# Patient Record
Sex: MALE | Race: WHITE | Employment: UNEMPLOYED | URBAN - NONMETROPOLITAN AREA
[De-identification: names, ages, dates, MRNs, and addresses within clinical notes are randomized per-mention and may not be internally consistent; named-entity substitution may affect disease eponyms.]

---

## 2024-10-12 ENCOUNTER — HOSPITAL ENCOUNTER (EMERGENCY)
Age: 49
Discharge: HOME | End: 2024-10-12
Attending: STUDENT IN AN ORGANIZED HEALTH CARE EDUCATION/TRAINING PROGRAM
Payer: MEDICAID

## 2024-10-12 VITALS
HEIGHT: 72 IN | TEMPERATURE: 98.7 F | BODY MASS INDEX: 37.61 KG/M2 | RESPIRATION RATE: 16 BRPM | OXYGEN SATURATION: 93 % | SYSTOLIC BLOOD PRESSURE: 171 MMHG | HEART RATE: 66 BPM | DIASTOLIC BLOOD PRESSURE: 77 MMHG | WEIGHT: 277.7 LBS

## 2024-10-12 DIAGNOSIS — L02.91 ABSCESS: Primary | ICD-10-CM

## 2024-10-12 PROCEDURE — 10061 I&D ABSCESS COMP/MULTIPLE: CPT

## 2024-10-12 PROCEDURE — 99282 EMERGENCY DEPT VISIT SF MDM: CPT

## 2024-10-12 RX ORDER — FUROSEMIDE 40 MG
40 TABLET ORAL DAILY
COMMUNITY
Start: 2024-10-13

## 2024-10-12 RX ORDER — CARVEDILOL 25 MG/1
1 TABLET ORAL 2 TIMES DAILY
COMMUNITY
Start: 2024-10-12

## 2024-10-12 RX ORDER — DULAGLUTIDE 0.75 MG/.5ML
0.75 INJECTION, SOLUTION SUBCUTANEOUS WEEKLY
COMMUNITY

## 2024-10-12 RX ORDER — ASPIRIN 81 MG/1
81 TABLET ORAL DAILY
COMMUNITY
Start: 2024-10-13

## 2024-10-12 RX ORDER — TRAZODONE HYDROCHLORIDE 100 MG/1
100 TABLET ORAL NIGHTLY
COMMUNITY

## 2024-10-12 RX ORDER — PANTOPRAZOLE SODIUM 40 MG/1
40 TABLET, DELAYED RELEASE ORAL DAILY
COMMUNITY
Start: 2024-10-13

## 2024-10-12 RX ORDER — NYSTATIN 100000 [USP'U]/G
POWDER TOPICAL
COMMUNITY
Start: 2024-10-12

## 2024-10-12 RX ORDER — LOSARTAN POTASSIUM 100 MG/1
100 TABLET ORAL DAILY
COMMUNITY
Start: 2024-10-13

## 2024-10-12 RX ORDER — SPIRONOLACTONE 25 MG/1
1 TABLET ORAL 2 TIMES DAILY
COMMUNITY
Start: 2024-10-12

## 2024-10-12 RX ORDER — INSULIN LISPRO 100 [IU]/ML
INJECTION, SOLUTION INTRAVENOUS; SUBCUTANEOUS
COMMUNITY
Start: 2024-10-12

## 2024-10-12 RX ORDER — CLINDAMYCIN HCL 300 MG
300 CAPSULE ORAL 3 TIMES DAILY
COMMUNITY
Start: 2024-10-12 | End: 2024-10-18

## 2024-10-12 RX ORDER — MIRTAZAPINE 15 MG/1
15 TABLET, FILM COATED ORAL NIGHTLY
COMMUNITY
Start: 2024-10-12 | End: 2024-10-18

## 2024-10-12 RX ORDER — NIFEDIPINE 60 MG/1
1 TABLET, EXTENDED RELEASE ORAL 2 TIMES DAILY
COMMUNITY
Start: 2024-10-12

## 2024-10-12 RX ORDER — ISOSORBIDE MONONITRATE 60 MG/1
60 TABLET, EXTENDED RELEASE ORAL DAILY
COMMUNITY
Start: 2024-10-13

## 2024-10-12 RX ORDER — ATORVASTATIN CALCIUM 40 MG/1
40 TABLET, FILM COATED ORAL DAILY
COMMUNITY
Start: 2024-10-12

## 2024-10-12 ASSESSMENT — PAIN - FUNCTIONAL ASSESSMENT
PAIN_FUNCTIONAL_ASSESSMENT: NONE - DENIES PAIN
PAIN_FUNCTIONAL_ASSESSMENT: NONE - DENIES PAIN

## 2024-10-13 NOTE — DISCHARGE INSTRUCTIONS
Follow-up with nurse practitioner at [facility] within the next 48 hours.  Check the wounds daily and return to emergency department for any worsening redness, if they become more prominent or swell more or if you have any fevers or chills or any new changing or worsening symptoms also return for chest pain, shortness of breath, Lancer dizziness, abdominal pain or any new changing or worsening symptoms where was here for reevaluation.  Take clindamycin given to you by previous provider and take full course of antibiotics

## 2024-10-15 ENCOUNTER — HOSPITAL ENCOUNTER (EMERGENCY)
Age: 49
Discharge: HOME | End: 2024-10-15
Attending: EMERGENCY MEDICINE
Payer: MEDICAID

## 2024-10-15 VITALS
DIASTOLIC BLOOD PRESSURE: 67 MMHG | HEIGHT: 72 IN | OXYGEN SATURATION: 100 % | TEMPERATURE: 97.5 F | HEART RATE: 61 BPM | RESPIRATION RATE: 16 BRPM | SYSTOLIC BLOOD PRESSURE: 141 MMHG | BODY MASS INDEX: 36.37 KG/M2 | WEIGHT: 268.5 LBS

## 2024-10-15 DIAGNOSIS — L72.3 SEBACEOUS CYST: Primary | ICD-10-CM

## 2024-10-15 PROCEDURE — 99282 EMERGENCY DEPT VISIT SF MDM: CPT

## 2024-10-15 PROCEDURE — 10060 I&D ABSCESS SIMPLE/SINGLE: CPT

## 2024-10-15 ASSESSMENT — PAIN - FUNCTIONAL ASSESSMENT
PAIN_FUNCTIONAL_ASSESSMENT: ACTIVITIES ARE NOT PREVENTED
PAIN_FUNCTIONAL_ASSESSMENT: 0-10
PAIN_FUNCTIONAL_ASSESSMENT: ACTIVITIES ARE NOT PREVENTED

## 2024-10-15 ASSESSMENT — PAIN DESCRIPTION - DESCRIPTORS
DESCRIPTORS: DISCOMFORT
DESCRIPTORS: DISCOMFORT

## 2024-10-15 ASSESSMENT — PAIN SCALES - GENERAL
PAINLEVEL_OUTOF10: 5
PAINLEVEL_OUTOF10: 10

## 2024-10-15 ASSESSMENT — PAIN DESCRIPTION - ORIENTATION
ORIENTATION: RIGHT
ORIENTATION: RIGHT

## 2024-10-15 ASSESSMENT — PAIN DESCRIPTION - LOCATION
LOCATION: BUTTOCKS
LOCATION: HIP

## 2024-10-15 ASSESSMENT — PAIN DESCRIPTION - PAIN TYPE
TYPE: ACUTE PAIN
TYPE: ACUTE PAIN

## 2024-10-15 NOTE — ED PROVIDER NOTES
Prescriptions    No medications on file       DISCONTINUED MEDICATIONS:  Discontinued Medications    No medications on file              (Please note that portions of this note were completed with a voice recognition program.  Efforts were made to edit the dictations but occasionally words are mis-transcribed.)    Paz Walsh MD (electronically signed)            Paz Walsh MD  10/15/24 0636

## 2024-10-18 ENCOUNTER — APPOINTMENT (OUTPATIENT)
Dept: GENERAL RADIOLOGY | Age: 49
End: 2024-10-18
Payer: MEDICAID

## 2024-10-18 ENCOUNTER — HOSPITAL ENCOUNTER (EMERGENCY)
Age: 49
Discharge: HOME | End: 2024-10-18
Attending: EMERGENCY MEDICINE
Payer: MEDICAID

## 2024-10-18 VITALS
HEART RATE: 53 BPM | SYSTOLIC BLOOD PRESSURE: 167 MMHG | DIASTOLIC BLOOD PRESSURE: 71 MMHG | RESPIRATION RATE: 16 BRPM | OXYGEN SATURATION: 96 % | BODY MASS INDEX: 36.69 KG/M2 | TEMPERATURE: 98 F | WEIGHT: 270.9 LBS | HEIGHT: 72 IN

## 2024-10-18 DIAGNOSIS — R42 LIGHTHEADEDNESS: ICD-10-CM

## 2024-10-18 DIAGNOSIS — E86.0 DEHYDRATION: Primary | ICD-10-CM

## 2024-10-18 LAB
ALBUMIN SERPL-MCNC: 3.8 G/DL (ref 3.4–5)
ALBUMIN/GLOB SERPL: 1.1 {RATIO} (ref 1.1–2.2)
ALP SERPL-CCNC: 76 U/L (ref 40–129)
ALT SERPL-CCNC: 9 U/L (ref 10–40)
ANION GAP SERPL CALCULATED.3IONS-SCNC: 11 MMOL/L (ref 3–16)
AST SERPL-CCNC: 19 U/L (ref 15–37)
BASOPHILS # BLD: 0.1 K/UL (ref 0–0.2)
BASOPHILS NFR BLD: 1.1 %
BILIRUB SERPL-MCNC: 0.3 MG/DL (ref 0–1)
BUN SERPL-MCNC: 18 MG/DL (ref 7–20)
CALCIUM SERPL-MCNC: 9.7 MG/DL (ref 8.3–10.6)
CHLORIDE SERPL-SCNC: 98 MMOL/L (ref 99–110)
CO2 SERPL-SCNC: 26 MMOL/L (ref 21–32)
CREAT SERPL-MCNC: 0.8 MG/DL (ref 0.9–1.3)
DEPRECATED RDW RBC AUTO: 17.8 % (ref 12.4–15.4)
EKG ATRIAL RATE: 57 BPM
EKG DIAGNOSIS: NORMAL
EKG P AXIS: 38 DEGREES
EKG P-R INTERVAL: 194 MS
EKG Q-T INTERVAL: 468 MS
EKG QRS DURATION: 124 MS
EKG QTC CALCULATION (BAZETT): 455 MS
EKG R AXIS: 264 DEGREES
EKG T AXIS: 84 DEGREES
EKG VENTRICULAR RATE: 57 BPM
EOSINOPHIL # BLD: 0.2 K/UL (ref 0–0.6)
EOSINOPHIL NFR BLD: 1.4 %
GFR SERPLBLD CREATININE-BSD FMLA CKD-EPI: >90 ML/MIN/{1.73_M2}
GLUCOSE BLD-MCNC: 137 MG/DL (ref 70–99)
GLUCOSE SERPL-MCNC: 139 MG/DL (ref 70–99)
HCT VFR BLD AUTO: 43.5 % (ref 40.5–52.5)
HGB BLD-MCNC: 13.3 G/DL (ref 13.5–17.5)
LACTATE BLDV-SCNC: 1.1 MMOL/L (ref 0.4–1.9)
LYMPHOCYTES # BLD: 1.8 K/UL (ref 1–5.1)
LYMPHOCYTES NFR BLD: 17.5 %
MCH RBC QN AUTO: 23.7 PG (ref 26–34)
MCHC RBC AUTO-ENTMCNC: 30.6 G/DL (ref 31–36)
MCV RBC AUTO: 77.5 FL (ref 80–100)
MONOCYTES # BLD: 0.5 K/UL (ref 0–1.3)
MONOCYTES NFR BLD: 5.2 %
NEUTROPHILS # BLD: 7.9 K/UL (ref 1.7–7.7)
NEUTROPHILS NFR BLD: 74.8 %
PERFORMED ON: ABNORMAL
PLATELET # BLD AUTO: 285 K/UL (ref 135–450)
PMV BLD AUTO: 7.7 FL (ref 5–10.5)
POTASSIUM SERPL-SCNC: 4.9 MMOL/L (ref 3.5–5.1)
PROT SERPL-MCNC: 7.4 G/DL (ref 6.4–8.2)
RBC # BLD AUTO: 5.61 M/UL (ref 4.2–5.9)
SODIUM SERPL-SCNC: 135 MMOL/L (ref 136–145)
TROPONIN, HIGH SENSITIVITY: 17 NG/L (ref 0–22)
TROPONIN, HIGH SENSITIVITY: 18 NG/L (ref 0–22)
WBC # BLD AUTO: 10.5 K/UL (ref 4–11)

## 2024-10-18 PROCEDURE — 71045 X-RAY EXAM CHEST 1 VIEW: CPT

## 2024-10-18 PROCEDURE — 84484 ASSAY OF TROPONIN QUANT: CPT

## 2024-10-18 PROCEDURE — 80053 COMPREHEN METABOLIC PANEL: CPT

## 2024-10-18 PROCEDURE — 99285 EMERGENCY DEPT VISIT HI MDM: CPT

## 2024-10-18 PROCEDURE — 96360 HYDRATION IV INFUSION INIT: CPT

## 2024-10-18 PROCEDURE — 85025 COMPLETE CBC W/AUTO DIFF WBC: CPT

## 2024-10-18 PROCEDURE — 36415 COLL VENOUS BLD VENIPUNCTURE: CPT

## 2024-10-18 PROCEDURE — 93005 ELECTROCARDIOGRAM TRACING: CPT | Performed by: EMERGENCY MEDICINE

## 2024-10-18 PROCEDURE — 2580000003 HC RX 258: Performed by: EMERGENCY MEDICINE

## 2024-10-18 PROCEDURE — 93010 ELECTROCARDIOGRAM REPORT: CPT | Performed by: INTERNAL MEDICINE

## 2024-10-18 PROCEDURE — 83605 ASSAY OF LACTIC ACID: CPT

## 2024-10-18 RX ORDER — 0.9 % SODIUM CHLORIDE 0.9 %
500 INTRAVENOUS SOLUTION INTRAVENOUS ONCE
Status: COMPLETED | OUTPATIENT
Start: 2024-10-18 | End: 2024-10-18

## 2024-10-18 RX ADMIN — SODIUM CHLORIDE 500 ML: 9 INJECTION, SOLUTION INTRAVENOUS at 13:03

## 2024-10-18 ASSESSMENT — LIFESTYLE VARIABLES
HOW OFTEN DO YOU HAVE A DRINK CONTAINING ALCOHOL: NEVER
HOW MANY STANDARD DRINKS CONTAINING ALCOHOL DO YOU HAVE ON A TYPICAL DAY: PATIENT DOES NOT DRINK

## 2024-10-18 ASSESSMENT — PAIN - FUNCTIONAL ASSESSMENT: PAIN_FUNCTIONAL_ASSESSMENT: NONE - DENIES PAIN

## 2024-10-18 NOTE — ED PROVIDER NOTES
EMERGENCY DEPARTMENT ENCOUNTER     [facility]     Pt Name: Venkata Parker   MRN: [id number]   Birthdate 1975   Date of evaluation: 10/18/2024   Provider: HELADIO ANTHONY MD   PCP: Ana Cortez   Note Started: 2:20 PM EDT 10/18/24     CHIEF COMPLAINT     Chief Complaint   Patient presents with    Loss of Consciousness     Reports feeling like he was going to pass out at [facility].        HISTORY OF PRESENT ILLNESS:  History from : Patient   Limitations to history : None     Venkata Parker is a 49 y.o. male who presents feeling like he might pass out.  The patient is at a rehab facility for alcohol abuse, he states that they changed his medications a little bit and he separately developed an abscess on his arm, with all of that he says he is just been feeling like he might faint today.  He has had no palpitations, no chest pain, no shortness of breath.  He does have a history of heart failure and diabetes.  His Remeron has been discontinued and he had been put on Seroquel.  He has had no fever, no nausea no vomiting.    Nursing Notes were all reviewed and agreed with or any disagreements were addressed in the HPI.     ROS: Positives and Pertinent negatives as per HPI.    PAST MEDICAL HISTORY     Past medical history:  has a past medical history of CHF (congestive heart failure) (HCC), Diabetes mellitus (HCC), and Hypertension.    Past surgical history:  has no past surgical history on file.      PHYSICAL EXAM:  ED Triage Vitals [10/18/24 1253]   BP Systolic BP Percentile Diastolic BP Percentile Temp Temp Source Pulse Respirations SpO2   (!) 160/67 -- -- 98 °F (36.7 °C) Oral 68 18 97 %      Height Weight - Scale         1.829 m (6') 122.9 kg (270 lb 14.4 oz)              Physical Exam   Pleasant cooperative gentleman no acute distress  HEENT dry mucous membranes, neck shows no JVD  Heart regular rate and rhythm with no murmurs rubs or gallops  Lungs are clear to auscultation

## 2024-10-18 NOTE — ED PROVIDER NOTES
Emergency Department Encounter    Patient: Venkata Parker  MRN: [id number]  : 1975  Date of Evaluation: 10/18/2024  ED Provider:  Asa Lowe MD    Triage Chief Complaint:   Wound Check (Pt states she was admitted for 3 days, has redness at IV site in left AC. Pt states he was started on antibiotics but wants checked again. Pt is from [facility]. )    [place]:  Venkata Parker is a 49 y.o. male with history seen below presenting with complaints of abscess to the left forearm.  Patient states he was recently in the hospital had IV placed in his left forearm and still feels some erythema with concern for abscess in this region.  Denies fevers or chills.  Denies motor or sensory changes, numbness or weakness.  Denies chest pain or shortness of breath.  Patient was just recently admitted to [facility] for CHF exacerbation and was diuresed and states he is feeling much better from this perspective.  Patient is on Eliquis currently.  Denies recent falls or trauma.  Denies abdominal pain, neck or back pain.  Denies headache, blurred vision, focal deficits, motor or sensory changes.  States he does have an area over the right lateral gluteal region which she has some mild swelling and discomfort as well.  Denies any overlying erythema in this region.    ROS - see HPI, below listed is current ROS at time of my eval:  At least 14 systems reviewed, negative other than HPI    Past Medical History:   Diagnosis Date    CHF (congestive heart failure) (HCC)     Diabetes mellitus (HCC)     Hypertension      History reviewed. No pertinent surgical history.  History reviewed. No pertinent family history.  Social History     Socioeconomic History    Marital status: Single     Spouse name: Not on file    Number of children: Not on file    Years of education: Not on file    Highest education level: Not on file   Occupational History    Not on file   Tobacco Use    Smoking status: Every Day     Types:

## 2024-11-30 ENCOUNTER — HOSPITAL ENCOUNTER (EMERGENCY)
Age: 49
Discharge: HOME | End: 2024-11-30
Attending: INTERNAL MEDICINE
Payer: MEDICAID

## 2024-11-30 VITALS
DIASTOLIC BLOOD PRESSURE: 65 MMHG | WEIGHT: 300.6 LBS | HEIGHT: 72 IN | HEART RATE: 55 BPM | BODY MASS INDEX: 40.71 KG/M2 | OXYGEN SATURATION: 95 % | TEMPERATURE: 97.9 F | SYSTOLIC BLOOD PRESSURE: 154 MMHG | RESPIRATION RATE: 18 BRPM

## 2024-11-30 DIAGNOSIS — L30.9 DERMATITIS: Primary | ICD-10-CM

## 2024-11-30 PROCEDURE — 99283 EMERGENCY DEPT VISIT LOW MDM: CPT

## 2024-11-30 RX ORDER — BENZOCAINE/MENTHOL 6 MG-10 MG
LOZENGE MUCOUS MEMBRANE
Qty: 30 G | Refills: 1 | Status: SHIPPED | OUTPATIENT
Start: 2024-11-30 | End: 2024-11-30

## 2024-11-30 RX ORDER — BENZOCAINE/MENTHOL 6 MG-10 MG
LOZENGE MUCOUS MEMBRANE
Qty: 30 G | Refills: 1 | Status: SHIPPED | OUTPATIENT
Start: 2024-11-30 | End: 2024-12-07

## 2024-11-30 ASSESSMENT — PAIN - FUNCTIONAL ASSESSMENT
PAIN_FUNCTIONAL_ASSESSMENT: ACTIVITIES ARE NOT PREVENTED
PAIN_FUNCTIONAL_ASSESSMENT: 0-10
PAIN_FUNCTIONAL_ASSESSMENT: 0-10
PAIN_FUNCTIONAL_ASSESSMENT: ACTIVITIES ARE NOT PREVENTED

## 2024-11-30 ASSESSMENT — PAIN DESCRIPTION - PAIN TYPE
TYPE: ACUTE PAIN
TYPE: ACUTE PAIN

## 2024-11-30 ASSESSMENT — PAIN DESCRIPTION - FREQUENCY
FREQUENCY: CONTINUOUS
FREQUENCY: CONTINUOUS

## 2024-11-30 ASSESSMENT — PAIN DESCRIPTION - LOCATION
LOCATION: HAND;FOOT
LOCATION: FOOT;HAND

## 2024-11-30 ASSESSMENT — PAIN SCALES - GENERAL: PAINLEVEL_OUTOF10: 5

## 2024-11-30 ASSESSMENT — PAIN DESCRIPTION - ONSET
ONSET: ON-GOING
ONSET: ON-GOING

## 2024-11-30 ASSESSMENT — PAIN DESCRIPTION - DESCRIPTORS
DESCRIPTORS: ITCHING
DESCRIPTORS: BURNING;ITCHING

## 2024-11-30 ASSESSMENT — PAIN DESCRIPTION - ORIENTATION: ORIENTATION: RIGHT;LEFT

## 2024-11-30 NOTE — ED PROVIDER NOTES
EMERGENCY MEDICINE PROVIDER NOTE    Patient Identification  Pt Name: Venkata Parker  MRN: [id number]  Birthdate 1975  Date of evaluation: 11/30/2024  Provider: KEELY GARCIA DO  PCP: Ana Cortez    Chief Complaint  Rash (Right foot spreading to hands.  States for 4-5 days)      HPI  (History provided by patient)  This is a 49 y.o. male who was brought in by self for a rash on the back of both hands and rash on the top of his right foot between his first and second digit.  He the symptoms started 4 to 5 days ago.  The rash is quite itchy and painful.  Patient denies fever and chills.  He denies any drainage from the skin.  He denies any injury.  Patient is presently residing at the [facility].     I have reviewed the following nursing documentation:  Allergies: Pcn [penicillins]    Past medical history:   Past Medical History:   Diagnosis Date    CHF (congestive heart failure) (HCC)     Diabetes mellitus (HCC)     Hypertension      Past surgical history: History reviewed. No pertinent surgical history.    Home medications:   Discharge Medication List as of 11/30/2024 12:04 PM        CONTINUE these medications which have NOT CHANGED    Details   QUEtiapine Fumarate (SEROQUEL PO) Take by mouthHistorical Med      apixaban (ELIQUIS) 5 MG TABS tablet Take 1 tablet by mouth 2 times dailyHistorical Med      aspirin 81 MG EC tablet Take 1 tablet by mouth dailyHistorical Med      atorvastatin (LIPITOR) 40 MG tablet Take 1 tablet by mouth dailyHistorical Med      carvedilol (COREG) 25 MG tablet Take 1 tablet by mouth 2 times dailyHistorical Med      dulaglutide (TRULICITY) 0.75 MG/0.5ML SOPN SC injection Inject 0.5 mLs into the skin once a weekHistorical Med      furosemide (LASIX) 40 MG tablet Take 1 tablet by mouth dailyHistorical Med      insulin lispro (HUMALOG,ADMELOG) 100 UNIT/ML SOLN injection vial Administer per sliding scale 15 MIN BEFORE MEALS AND AT BEDTIME. SCALE 2 Blood Sugars: 101-150 - None 151-200 - 2  aspect of both hands.  Patient has an area that is about 2 cm in diameter on the dorsal aspect of the right foot between the first and second digit it is also erythematous and flaky.  The areas are not warm to the touch.  I have low suspicion for cellulitis.    Procedures          Radiology  No orders to display       Labs  No results found for this visit on 11/30/24.    SEP-1  Is this patient to be included in the SEP-1 Core Measure due to severe sepsis or septic shock?   no     Screenings                    Medications Given During ED Visit  Medications - No data to display    Records Reviewed  I reviewed the patient's previous medical records    Social Determinants of Health  None    Chronic Conditions affecting care   has a past medical history of CHF (congestive heart failure) (HCC), Diabetes mellitus (HCC), and Hypertension.    MDM and ED Course  This appears to be dermatitis secondary to dry skin.  Patient states the rash hurts and is itchy.  I wrote the patient for hydrocortisone cream and encouraged him to use old male cream over-the-counter.  Patient is presently at the [facility].  I have low suspicion for Graves-Lenin syndrome and TEN.  This does not appear to be an allergic reaction or cellulitis.  Patient is stable for discharge.  Patient understands the plan to follow-up in the outpatient setting if symptoms persist.      Critical Care Time: 0 Minutes  This excludes separately billable procedures.      Final Impression  1. Dermatitis        Blood pressure (!) 154/65, pulse 55, temperature 97.9 °F (36.6 °C), temperature source Oral, resp. rate 18, height 1.829 m (6'), weight (!) 136.4 kg (300 lb 9.6 oz), SpO2 95%.     Disposition:  DISPOSITION Decision To Discharge 11/30/2024 11:51:36 AM           Patient Referrals:  [facility]  [address] [place] [address] [place] [address]  [phone]    As needed      Discharge Medications:  Discharge Medication List as of 11/30/2024

## 2024-12-08 ENCOUNTER — APPOINTMENT (OUTPATIENT)
Dept: CT IMAGING | Age: 49
End: 2024-12-08
Payer: MEDICAID

## 2024-12-08 ENCOUNTER — HOSPITAL ENCOUNTER (INPATIENT)
Age: 49
LOS: 3 days | Discharge: NURSING, REHAB OR LONG-TERM CARE | End: 2024-12-11
Attending: STUDENT IN AN ORGANIZED HEALTH CARE EDUCATION/TRAINING PROGRAM | Admitting: INTERNAL MEDICINE
Payer: MEDICAID

## 2024-12-08 ENCOUNTER — HOSPITAL ENCOUNTER (EMERGENCY)
Age: 49
Discharge: HOME | End: 2024-12-08
Attending: EMERGENCY MEDICINE | Admitting: INTERNAL MEDICINE
Payer: MEDICAID

## 2024-12-08 ENCOUNTER — APPOINTMENT (OUTPATIENT)
Dept: GENERAL RADIOLOGY | Age: 49
End: 2024-12-08
Payer: MEDICAID

## 2024-12-08 VITALS
DIASTOLIC BLOOD PRESSURE: 74 MMHG | BODY MASS INDEX: 40.86 KG/M2 | TEMPERATURE: 98.3 F | WEIGHT: 301.7 LBS | OXYGEN SATURATION: 98 % | HEART RATE: 84 BPM | RESPIRATION RATE: 18 BRPM | SYSTOLIC BLOOD PRESSURE: 132 MMHG | HEIGHT: 72 IN

## 2024-12-08 DIAGNOSIS — H53.9 VISION CHANGES: ICD-10-CM

## 2024-12-08 DIAGNOSIS — R93.0 ABNORMAL HEAD CT: ICD-10-CM

## 2024-12-08 DIAGNOSIS — R51.9 HEADACHE, UNSPECIFIED HEADACHE TYPE: Primary | ICD-10-CM

## 2024-12-08 DIAGNOSIS — H53.9 MONOCULAR VISUAL DISTURBANCE: Primary | ICD-10-CM

## 2024-12-08 DIAGNOSIS — I67.9 INTRACRANIAL VASCULAR STENOSIS: ICD-10-CM

## 2024-12-08 DIAGNOSIS — R42 DIZZINESS: ICD-10-CM

## 2024-12-08 DIAGNOSIS — R51.9 ACUTE NONINTRACTABLE HEADACHE, UNSPECIFIED HEADACHE TYPE: ICD-10-CM

## 2024-12-08 DIAGNOSIS — H53.8 BLURRY VISION, RIGHT EYE: ICD-10-CM

## 2024-12-08 DIAGNOSIS — G93.89 ENCEPHALOMALACIA ON IMAGING STUDY: ICD-10-CM

## 2024-12-08 DIAGNOSIS — Z53.29 LEFT AGAINST MEDICAL ADVICE: ICD-10-CM

## 2024-12-08 PROBLEM — I63.9 STROKE OF UNKNOWN CAUSE (HCC): Status: ACTIVE | Noted: 2024-12-08

## 2024-12-08 PROBLEM — G45.9 TIA (TRANSIENT ISCHEMIC ATTACK): Status: ACTIVE | Noted: 2024-12-08

## 2024-12-08 LAB
ALBUMIN SERPL-MCNC: 4.1 G/DL (ref 3.4–5)
ALBUMIN SERPL-MCNC: 4.3 G/DL (ref 3.4–5)
ALBUMIN/GLOB SERPL: 1.5 {RATIO} (ref 1.1–2.2)
ALBUMIN/GLOB SERPL: 1.5 {RATIO} (ref 1.1–2.2)
ALP SERPL-CCNC: 59 U/L (ref 40–129)
ALP SERPL-CCNC: 60 U/L (ref 40–129)
ALT SERPL-CCNC: 14 U/L (ref 10–40)
ALT SERPL-CCNC: 14 U/L (ref 10–40)
ANION GAP SERPL CALCULATED.3IONS-SCNC: 10 MMOL/L (ref 3–16)
ANION GAP SERPL CALCULATED.3IONS-SCNC: 12 MMOL/L (ref 3–16)
AST SERPL-CCNC: 16 U/L (ref 15–37)
AST SERPL-CCNC: 20 U/L (ref 15–37)
BASOPHILS # BLD: 0.1 K/UL (ref 0–0.2)
BASOPHILS # BLD: 0.1 K/UL (ref 0–0.2)
BASOPHILS NFR BLD: 1 %
BASOPHILS NFR BLD: 1 %
BILIRUB SERPL-MCNC: <0.2 MG/DL (ref 0–1)
BILIRUB SERPL-MCNC: <0.2 MG/DL (ref 0–1)
BUN SERPL-MCNC: 31 MG/DL (ref 7–20)
BUN SERPL-MCNC: 32 MG/DL (ref 7–20)
CALCIUM SERPL-MCNC: 8.7 MG/DL (ref 8.3–10.6)
CALCIUM SERPL-MCNC: 9 MG/DL (ref 8.3–10.6)
CHLORIDE SERPL-SCNC: 100 MMOL/L (ref 99–110)
CHLORIDE SERPL-SCNC: 102 MMOL/L (ref 99–110)
CO2 SERPL-SCNC: 28 MMOL/L (ref 21–32)
CO2 SERPL-SCNC: 29 MMOL/L (ref 21–32)
CREAT SERPL-MCNC: 0.7 MG/DL (ref 0.9–1.3)
CREAT SERPL-MCNC: 0.9 MG/DL (ref 0.9–1.3)
DEPRECATED RDW RBC AUTO: 19.2 % (ref 12.4–15.4)
DEPRECATED RDW RBC AUTO: 19.5 % (ref 12.4–15.4)
EOSINOPHIL # BLD: 0.2 K/UL (ref 0–0.6)
EOSINOPHIL # BLD: 0.2 K/UL (ref 0–0.6)
EOSINOPHIL NFR BLD: 2.3 %
EOSINOPHIL NFR BLD: 2.3 %
GFR SERPLBLD CREATININE-BSD FMLA CKD-EPI: >90 ML/MIN/{1.73_M2}
GFR SERPLBLD CREATININE-BSD FMLA CKD-EPI: >90 ML/MIN/{1.73_M2}
GLUCOSE BLD-MCNC: 120 MG/DL (ref 70–99)
GLUCOSE SERPL-MCNC: 104 MG/DL (ref 70–99)
GLUCOSE SERPL-MCNC: 119 MG/DL (ref 70–99)
HCT VFR BLD AUTO: 43.1 % (ref 40.5–52.5)
HCT VFR BLD AUTO: 43.2 % (ref 40.5–52.5)
HGB BLD-MCNC: 13.7 G/DL (ref 13.5–17.5)
HGB BLD-MCNC: 14.1 G/DL (ref 13.5–17.5)
LYMPHOCYTES # BLD: 1.7 K/UL (ref 1–5.1)
LYMPHOCYTES # BLD: 2 K/UL (ref 1–5.1)
LYMPHOCYTES NFR BLD: 20.9 %
LYMPHOCYTES NFR BLD: 23.6 %
MCH RBC QN AUTO: 24.9 PG (ref 26–34)
MCH RBC QN AUTO: 25.5 PG (ref 26–34)
MCHC RBC AUTO-ENTMCNC: 31.6 G/DL (ref 31–36)
MCHC RBC AUTO-ENTMCNC: 32.8 G/DL (ref 31–36)
MCV RBC AUTO: 77.7 FL (ref 80–100)
MCV RBC AUTO: 78.7 FL (ref 80–100)
MONOCYTES # BLD: 0.6 K/UL (ref 0–1.3)
MONOCYTES # BLD: 0.7 K/UL (ref 0–1.3)
MONOCYTES NFR BLD: 7.8 %
MONOCYTES NFR BLD: 8.7 %
NEUTROPHILS # BLD: 5.5 K/UL (ref 1.7–7.7)
NEUTROPHILS # BLD: 5.6 K/UL (ref 1.7–7.7)
NEUTROPHILS NFR BLD: 64.4 %
NEUTROPHILS NFR BLD: 68 %
NT-PROBNP SERPL-MCNC: 601 PG/ML (ref 0–124)
PERFORMED ON: ABNORMAL
PLATELET # BLD AUTO: 193 K/UL (ref 135–450)
PLATELET # BLD AUTO: 199 K/UL (ref 135–450)
PMV BLD AUTO: 7.7 FL (ref 5–10.5)
PMV BLD AUTO: 7.7 FL (ref 5–10.5)
POTASSIUM SERPL-SCNC: 4.3 MMOL/L (ref 3.5–5.1)
POTASSIUM SERPL-SCNC: 4.5 MMOL/L (ref 3.5–5.1)
PROT SERPL-MCNC: 6.9 G/DL (ref 6.4–8.2)
PROT SERPL-MCNC: 7.1 G/DL (ref 6.4–8.2)
RBC # BLD AUTO: 5.49 M/UL (ref 4.2–5.9)
RBC # BLD AUTO: 5.55 M/UL (ref 4.2–5.9)
SODIUM SERPL-SCNC: 140 MMOL/L (ref 136–145)
SODIUM SERPL-SCNC: 141 MMOL/L (ref 136–145)
TROPONIN, HIGH SENSITIVITY: 18 NG/L (ref 0–22)
TROPONIN, HIGH SENSITIVITY: 21 NG/L (ref 0–22)
WBC # BLD AUTO: 8.2 K/UL (ref 4–11)
WBC # BLD AUTO: 8.6 K/UL (ref 4–11)

## 2024-12-08 PROCEDURE — 6360000004 HC RX CONTRAST MEDICATION: Performed by: PHYSICIAN ASSISTANT

## 2024-12-08 PROCEDURE — 85025 COMPLETE CBC W/AUTO DIFF WBC: CPT

## 2024-12-08 PROCEDURE — 80053 COMPREHEN METABOLIC PANEL: CPT

## 2024-12-08 PROCEDURE — 70450 CT HEAD/BRAIN W/O DYE: CPT

## 2024-12-08 PROCEDURE — 84484 ASSAY OF TROPONIN QUANT: CPT

## 2024-12-08 PROCEDURE — 85652 RBC SED RATE AUTOMATED: CPT

## 2024-12-08 PROCEDURE — 99285 EMERGENCY DEPT VISIT HI MDM: CPT

## 2024-12-08 PROCEDURE — 70498 CT ANGIOGRAPHY NECK: CPT

## 2024-12-08 PROCEDURE — 2060000000 HC ICU INTERMEDIATE R&B

## 2024-12-08 PROCEDURE — 71045 X-RAY EXAM CHEST 1 VIEW: CPT

## 2024-12-08 PROCEDURE — 93005 ELECTROCARDIOGRAM TRACING: CPT | Performed by: PHYSICIAN ASSISTANT

## 2024-12-08 PROCEDURE — 1200000000 HC SEMI PRIVATE

## 2024-12-08 PROCEDURE — 80061 LIPID PANEL: CPT

## 2024-12-08 PROCEDURE — 83880 ASSAY OF NATRIURETIC PEPTIDE: CPT

## 2024-12-08 PROCEDURE — 6370000000 HC RX 637 (ALT 250 FOR IP): Performed by: PHYSICIAN ASSISTANT

## 2024-12-08 PROCEDURE — 36415 COLL VENOUS BLD VENIPUNCTURE: CPT

## 2024-12-08 PROCEDURE — 86140 C-REACTIVE PROTEIN: CPT

## 2024-12-08 RX ORDER — PANTOPRAZOLE SODIUM 40 MG/1
40 TABLET, DELAYED RELEASE ORAL
Status: CANCELLED | OUTPATIENT
Start: 2024-12-09

## 2024-12-08 RX ORDER — LOSARTAN POTASSIUM 50 MG/1
100 TABLET ORAL DAILY
Status: CANCELLED | OUTPATIENT
Start: 2024-12-08

## 2024-12-08 RX ORDER — CARVEDILOL 25 MG/1
25 TABLET ORAL 2 TIMES DAILY
Status: CANCELLED | OUTPATIENT
Start: 2024-12-08

## 2024-12-08 RX ORDER — NIFEDIPINE 60 MG/1
60 TABLET, EXTENDED RELEASE ORAL 2 TIMES DAILY
Status: CANCELLED | OUTPATIENT
Start: 2024-12-08

## 2024-12-08 RX ORDER — FUROSEMIDE 40 MG/1
40 TABLET ORAL DAILY
Status: CANCELLED | OUTPATIENT
Start: 2024-12-09

## 2024-12-08 RX ORDER — ONDANSETRON 2 MG/ML
4 INJECTION INTRAMUSCULAR; INTRAVENOUS EVERY 6 HOURS PRN
Status: CANCELLED | OUTPATIENT
Start: 2024-12-08

## 2024-12-08 RX ORDER — SODIUM CHLORIDE 0.9 % (FLUSH) 0.9 %
5-40 SYRINGE (ML) INJECTION EVERY 12 HOURS SCHEDULED
Status: CANCELLED | OUTPATIENT
Start: 2024-12-08

## 2024-12-08 RX ORDER — ATORVASTATIN CALCIUM 40 MG/1
40 TABLET, FILM COATED ORAL DAILY
Status: CANCELLED | OUTPATIENT
Start: 2024-12-08

## 2024-12-08 RX ORDER — POLYETHYLENE GLYCOL 3350 17 G/17G
17 POWDER, FOR SOLUTION ORAL DAILY PRN
Status: CANCELLED | OUTPATIENT
Start: 2024-12-08

## 2024-12-08 RX ORDER — ASPIRIN 325 MG
325 TABLET ORAL ONCE
Status: COMPLETED | OUTPATIENT
Start: 2024-12-08 | End: 2024-12-08

## 2024-12-08 RX ORDER — ISOSORBIDE MONONITRATE 60 MG/1
60 TABLET, EXTENDED RELEASE ORAL DAILY
Status: CANCELLED | OUTPATIENT
Start: 2024-12-08

## 2024-12-08 RX ORDER — TRAZODONE HYDROCHLORIDE 100 MG/1
100 TABLET ORAL NIGHTLY
Status: CANCELLED | OUTPATIENT
Start: 2024-12-08

## 2024-12-08 RX ORDER — GLUCAGON 1 MG/ML
1 KIT INJECTION PRN
Status: CANCELLED | OUTPATIENT
Start: 2024-12-08

## 2024-12-08 RX ORDER — ACETAMINOPHEN 325 MG/1
650 TABLET ORAL ONCE
Status: COMPLETED | OUTPATIENT
Start: 2024-12-08 | End: 2024-12-08

## 2024-12-08 RX ORDER — DEXTROSE MONOHYDRATE 100 MG/ML
INJECTION, SOLUTION INTRAVENOUS CONTINUOUS PRN
Status: CANCELLED | OUTPATIENT
Start: 2024-12-08

## 2024-12-08 RX ORDER — ASPIRIN 81 MG/1
81 TABLET ORAL DAILY
Status: CANCELLED | OUTPATIENT
Start: 2024-12-08

## 2024-12-08 RX ORDER — INSULIN LISPRO 100 [IU]/ML
0-4 INJECTION, SOLUTION INTRAVENOUS; SUBCUTANEOUS
Status: CANCELLED | OUTPATIENT
Start: 2024-12-08

## 2024-12-08 RX ORDER — SODIUM CHLORIDE 9 MG/ML
INJECTION, SOLUTION INTRAVENOUS PRN
Status: CANCELLED | OUTPATIENT
Start: 2024-12-08

## 2024-12-08 RX ORDER — IOPAMIDOL 755 MG/ML
75 INJECTION, SOLUTION INTRAVASCULAR
Status: COMPLETED | OUTPATIENT
Start: 2024-12-08 | End: 2024-12-08

## 2024-12-08 RX ORDER — SODIUM CHLORIDE 0.9 % (FLUSH) 0.9 %
5-40 SYRINGE (ML) INJECTION PRN
Status: CANCELLED | OUTPATIENT
Start: 2024-12-08

## 2024-12-08 RX ORDER — SPIRONOLACTONE 25 MG/1
25 TABLET ORAL 2 TIMES DAILY
Status: CANCELLED | OUTPATIENT
Start: 2024-12-08

## 2024-12-08 RX ORDER — ONDANSETRON 4 MG/1
4 TABLET, ORALLY DISINTEGRATING ORAL EVERY 8 HOURS PRN
Status: CANCELLED | OUTPATIENT
Start: 2024-12-08

## 2024-12-08 RX ADMIN — ACETAMINOPHEN 650 MG: 325 TABLET ORAL at 17:32

## 2024-12-08 RX ADMIN — ASPIRIN 325 MG: 325 TABLET ORAL at 17:32

## 2024-12-08 RX ADMIN — IOPAMIDOL 75 ML: 755 INJECTION, SOLUTION INTRAVENOUS at 14:26

## 2024-12-08 ASSESSMENT — PAIN - FUNCTIONAL ASSESSMENT
PAIN_FUNCTIONAL_ASSESSMENT: NONE - DENIES PAIN
PAIN_FUNCTIONAL_ASSESSMENT: 0-10
PAIN_FUNCTIONAL_ASSESSMENT: NONE - DENIES PAIN
PAIN_FUNCTIONAL_ASSESSMENT: PREVENTS OR INTERFERES SOME ACTIVE ACTIVITIES AND ADLS
PAIN_FUNCTIONAL_ASSESSMENT: NONE - DENIES PAIN
PAIN_FUNCTIONAL_ASSESSMENT: 0-10
PAIN_FUNCTIONAL_ASSESSMENT: NONE - DENIES PAIN

## 2024-12-08 ASSESSMENT — PAIN DESCRIPTION - ONSET: ONSET: SUDDEN

## 2024-12-08 ASSESSMENT — PAIN DESCRIPTION - DESCRIPTORS: DESCRIPTORS: THROBBING

## 2024-12-08 ASSESSMENT — PAIN DESCRIPTION - LOCATION
LOCATION: HEAD
LOCATION: HEAD

## 2024-12-08 ASSESSMENT — PAIN SCALES - GENERAL
PAINLEVEL_OUTOF10: 2
PAINLEVEL_OUTOF10: 3
PAINLEVEL_OUTOF10: 3

## 2024-12-08 ASSESSMENT — PAIN DESCRIPTION - ORIENTATION: ORIENTATION: LEFT;RIGHT

## 2024-12-08 ASSESSMENT — VISUAL ACUITY
OS: 20/25
OD: 20/70
OU: 20/40

## 2024-12-08 ASSESSMENT — LIFESTYLE VARIABLES
HOW MANY STANDARD DRINKS CONTAINING ALCOHOL DO YOU HAVE ON A TYPICAL DAY: PATIENT DOES NOT DRINK
HOW OFTEN DO YOU HAVE A DRINK CONTAINING ALCOHOL: NEVER

## 2024-12-08 ASSESSMENT — PAIN DESCRIPTION - FREQUENCY: FREQUENCY: CONTINUOUS

## 2024-12-08 ASSESSMENT — PAIN DESCRIPTION - PAIN TYPE: TYPE: ACUTE PAIN

## 2024-12-08 NOTE — ED PROVIDER NOTES
specialist.  He is denying any loss of visual field but cannot see distance on right is new is very blurry when he tries to read the sign across the room but states he can see up close both eyes the same.  Headache has been constant since yesterday morning 6 AM states is not as bad now.  No numbness or weakness of extremities ambulatory with normal gait intact sensation equal strength 5 out of 5.  NIHSS 0.  Not a code stroke symptoms started 6 AM yesterday > 24 hours ago.  Visual acuities obtained at triage 20/70 right eye and 20/25 left eye. Ocular pressures 13 on right and 12 on left.      On labs white count is 8.2.  Hemoglobin 13.7.  Sodium 140.  Potassium 4.5.  Glucose 119.  BUN 32.  Creatinine 0.7.  Troponin 18.  EKG no significant change from previous.      Chest x-ray no acute cardiopulmonary disease.    CT brain no acute intracranial abnormality.  No hemorrhage.  Focal encephalomalacia in the right occipital lobe with compensatory enlargement of the right lateral ventricle suspected remote area of infarct.    CTA head and neck severe focal stenosis in the mid P2 segment of the left PCA.  Moderate stenosis in the proximal M2 segment of the right PCA.  No large vessel occlusion in the neck.      Spoke with neurosurgery Jaycee KABA at [facility] discussed case, test results and exam findings she has spoke with neurosurgery attending and patient can be admitted at [facility] does not need to be transferred to [facility].      Consult to hospitalist at [facility] who accepted the patient and placed orders.  He is stable.  He asked for something to eat and was given a box lunch and a diet Pepsi.      Patient was already accepted for admission to [facility] but he decided he did not want to stay in the hospital had reported that he felt better and he was gone to go back to the [facility].  He signed out AGAINST MEDICAL ADVICE.  I had discussed with him risk of worsening condition,  undiagnosed condition, permanent disability, permanent loss of vision and even death and he understands these risks.  He is alert and oriented of sound mind and competent to make his own decisions.  I advised him he may return at any time.  Recommend MRI brain and close follow-up with his doctor.      I am the Primary Clinician of Record.  FINAL IMPRESSION      1. Headache, unspecified headache type    2. Blurry vision, right eye    3. Dizziness    4. Encephalomalacia on imaging study    5. Intracranial vascular stenosis    6. Left against medical advice          DISPOSITION/PLAN     DISPOSITION AMA    PATIENT REFERRED TO:  No follow-up provider specified.    DISCHARGE MEDICATIONS:  Discharge Medication List as of 12/8/2024  6:26 PM          DISCONTINUED MEDICATIONS:  Discharge Medication List as of 12/8/2024  6:26 PM                 (Please note that portions of this note were completed with a voice recognition program.  Efforts were made to edit the dictations but occasionally words are mis-transcribed.)    Stephanie Canales PA-C (electronically signed)           Stephanie Canales PA-C  12/08/24 1944

## 2024-12-08 NOTE — ED NOTES
Pt refusing admission, states \"I feel much better. I'm just going to go back to the [facility].\" AMA risks explained with pt verbalizing understanding. AMA paper signed and witnessed by 2 RNs

## 2024-12-08 NOTE — ED PROVIDER NOTES
ED Attending Attestation Note    I personally saw the patient and made/approved the management plan and take responsibility for patient management.     Briefly, 49 y.o. male presents with blurred vision and headache.  Patient states that headache is currently 3 out of 10 and associated with decreased peripheral vision out of the left eye and blurred vision.  He was seen at an outside facility told that he has glaucoma in the left eye and referred for some type of ophthalmologic surgery per the patient but he is unsure of with who or when..     Focused exam:   Gen: 49 y.o. male, NAD  GCS 15.  No facial drooping.  No slurred speech.  Extraocular movements are intact.  No proptosis.  Pupils are 4 mm and reactive bilaterally.    Imaging:   XR CHEST PORTABLE   Final Result   No acute cardiopulmonary process. Mild cardiomegaly.         CTA HEAD NECK W CONTRAST   Final Result   Severe focal stenosis in the mid P2 segment of the left PCA.      Hypoplastic right PCA.      Moderate stenosis in the proximal M2 segment of the right MCA.      No large vessel occlusion in the neck.         CT HEAD WO CONTRAST   Final Result   1. No acute intracranial finding.   2. Focal encephalomalacia in the right occipital lobe with compensatory   enlargement of the right lateral ventricle.  Suspected remote area of   infarct.  Correlate with neurologic history and consider follow-up MRI given   acute on set of symptoms.            The Ekg interpreted by me shows  normal sinus rhythm with a rate of 62  Axis is rightward  QTc is   483ms  Intervals and Durations are unremarkable.      ST Segments: nonspecific changes  No significant change from prior EKG dated 10/18/24        MDM:   Patient presented for evaluation of headache and vision changes.  Concern for possible posterior stroke.  Given the length of ongoing symptoms, code stroke not initiated but stroke evaluation was obtained.  He does have severe focal stenosis noted at the mid P2  segment of the left PCA noted as well as additional findings as noted above on CTA head and neck.  No intracranial hemorrhage noted.  These findings were discussed with the patient.  Oral aspirin ordered.  He is already on Eliquis as well.  Neurosurgery was consulted regarding these findings and did not feel that further intervention neurosurgically would be done requiring transfer to [facility] for any intervention, but was in agreement of plan for admission for MRI and further neurologic evaluation for medication optimization and evaluation any further opportunity for improvement to prevent further complications.  This was discussed with the hospitalist team at [facility] who is graciously accepted the patient.  However, after patient's orders were placed and bed was assigned with plans for transfer to [facility], patient has made the decision to leave AMA.  Risks and benefits of doing so versus staying for further evaluation and transferred at [facility] for admission were discussed.  I did discuss possible permanent disability and/or death if he leaves AGAINST MEDICAL ADVICE, up to including also blindness.  Patient verbalized understanding of this and still elected to leave AGAINST MEDICAL ADVICE.    I discussed the nature and purpose, risks and benefits, as well as, the alternatives of admission or further ER management with Venkata Parker. Venkata Parker was given the time and opportunity to ask questions and consider their options, and after the discussion, Venkata Parker decided to refuse. I informed Venkata Parker that refusal could lead to, but was not limited to, death, permanent disability, or severe pain. If present, I asked the relatives or significant others of Venkata Parekr to dissuade them without success. Prior to refusing, their nurse and I determined and agreed that Venkata Parker had the capacity to make this decision and understood the consequences of that decision. Venkata Keith signed the refusal of

## 2024-12-09 ENCOUNTER — APPOINTMENT (OUTPATIENT)
Dept: MRI IMAGING | Age: 49
End: 2024-12-09
Payer: MEDICAID

## 2024-12-09 PROBLEM — H53.462: Status: ACTIVE | Noted: 2024-12-09

## 2024-12-09 PROBLEM — Z86.73 HISTORY OF STROKE: Status: ACTIVE | Noted: 2024-12-09

## 2024-12-09 LAB
ANION GAP SERPL CALCULATED.3IONS-SCNC: 6 MMOL/L (ref 3–16)
BASOPHILS # BLD: 0.1 K/UL (ref 0–0.2)
BASOPHILS NFR BLD: 1.5 %
BILIRUB UR QL STRIP.AUTO: NEGATIVE
BUN SERPL-MCNC: 24 MG/DL (ref 7–20)
CALCIUM SERPL-MCNC: 8.4 MG/DL (ref 8.3–10.6)
CHLORIDE SERPL-SCNC: 100 MMOL/L (ref 99–110)
CHOLEST SERPL-MCNC: 120 MG/DL (ref 0–199)
CLARITY UR: CLEAR
CO2 SERPL-SCNC: 29 MMOL/L (ref 21–32)
COLOR UR: YELLOW
CREAT SERPL-MCNC: 0.8 MG/DL (ref 0.9–1.3)
CRP SERPL-MCNC: <3 MG/L (ref 0–5.1)
CRP SERPL-MCNC: <3 MG/L (ref 0–5.1)
D-DIMER QUANTITATIVE: 0.59 UG/ML FEU (ref 0–0.6)
DEPRECATED RDW RBC AUTO: 19 % (ref 12.4–15.4)
EKG ATRIAL RATE: 62 BPM
EKG DIAGNOSIS: NORMAL
EKG P AXIS: 37 DEGREES
EKG P-R INTERVAL: 192 MS
EKG Q-T INTERVAL: 476 MS
EKG QRS DURATION: 112 MS
EKG QTC CALCULATION (BAZETT): 483 MS
EKG R AXIS: 231 DEGREES
EKG T AXIS: 82 DEGREES
EKG VENTRICULAR RATE: 62 BPM
EOSINOPHIL # BLD: 0.2 K/UL (ref 0–0.6)
EOSINOPHIL NFR BLD: 2.4 %
ERYTHROCYTE [SEDIMENTATION RATE] IN BLOOD BY WESTERGREN METHOD: 13 MM/HR (ref 0–15)
ERYTHROCYTE [SEDIMENTATION RATE] IN BLOOD BY WESTERGREN METHOD: 17 MM/HR (ref 0–15)
GFR SERPLBLD CREATININE-BSD FMLA CKD-EPI: >90 ML/MIN/{1.73_M2}
GLUCOSE BLD-MCNC: 109 MG/DL (ref 70–99)
GLUCOSE BLD-MCNC: 135 MG/DL (ref 70–99)
GLUCOSE BLD-MCNC: 169 MG/DL (ref 70–99)
GLUCOSE BLD-MCNC: 177 MG/DL (ref 70–99)
GLUCOSE BLD-MCNC: 180 MG/DL (ref 70–99)
GLUCOSE SERPL-MCNC: 126 MG/DL (ref 70–99)
GLUCOSE UR STRIP.AUTO-MCNC: >=1000 MG/DL
HCT VFR BLD AUTO: 43.4 % (ref 40.5–52.5)
HDLC SERPL-MCNC: 32 MG/DL (ref 40–60)
HGB BLD-MCNC: 14.1 G/DL (ref 13.5–17.5)
HGB UR QL STRIP.AUTO: NEGATIVE
KETONES UR STRIP.AUTO-MCNC: NEGATIVE MG/DL
LDLC SERPL CALC-MCNC: 55 MG/DL
LEUKOCYTE ESTERASE UR QL STRIP.AUTO: NEGATIVE
LYMPHOCYTES # BLD: 1.7 K/UL (ref 1–5.1)
LYMPHOCYTES NFR BLD: 21 %
MCH RBC QN AUTO: 25.5 PG (ref 26–34)
MCHC RBC AUTO-ENTMCNC: 32.5 G/DL (ref 31–36)
MCV RBC AUTO: 78.4 FL (ref 80–100)
MONOCYTES # BLD: 0.7 K/UL (ref 0–1.3)
MONOCYTES NFR BLD: 8.8 %
NEUTROPHILS # BLD: 5.3 K/UL (ref 1.7–7.7)
NEUTROPHILS NFR BLD: 66.3 %
NITRITE UR QL STRIP.AUTO: NEGATIVE
PERFORMED ON: ABNORMAL
PH UR STRIP.AUTO: 6 [PH] (ref 5–8)
PLATELET # BLD AUTO: 197 K/UL (ref 135–450)
PMV BLD AUTO: 7.8 FL (ref 5–10.5)
POTASSIUM SERPL-SCNC: 4.7 MMOL/L (ref 3.5–5.1)
PROT UR STRIP.AUTO-MCNC: NEGATIVE MG/DL
RBC # BLD AUTO: 5.54 M/UL (ref 4.2–5.9)
SODIUM SERPL-SCNC: 135 MMOL/L (ref 136–145)
SP GR UR STRIP.AUTO: 1.01 (ref 1–1.03)
TRIGL SERPL-MCNC: 167 MG/DL (ref 0–150)
UA COMPLETE W REFLEX CULTURE PNL UR: ABNORMAL
UA DIPSTICK W REFLEX MICRO PNL UR: ABNORMAL
URN SPEC COLLECT METH UR: ABNORMAL
UROBILINOGEN UR STRIP-ACNC: 0.2 E.U./DL
VLDLC SERPL CALC-MCNC: 33 MG/DL
WBC # BLD AUTO: 8 K/UL (ref 4–11)

## 2024-12-09 PROCEDURE — A9579 GAD-BASE MR CONTRAST NOS,1ML: HCPCS | Performed by: STUDENT IN AN ORGANIZED HEALTH CARE EDUCATION/TRAINING PROGRAM

## 2024-12-09 PROCEDURE — 80048 BASIC METABOLIC PNL TOTAL CA: CPT

## 2024-12-09 PROCEDURE — 36415 COLL VENOUS BLD VENIPUNCTURE: CPT

## 2024-12-09 PROCEDURE — APPSS45 APP SPLIT SHARED TIME 31-45 MINUTES

## 2024-12-09 PROCEDURE — 2580000003 HC RX 258: Performed by: INTERNAL MEDICINE

## 2024-12-09 PROCEDURE — 2060000000 HC ICU INTERMEDIATE R&B

## 2024-12-09 PROCEDURE — 6360000002 HC RX W HCPCS: Performed by: STUDENT IN AN ORGANIZED HEALTH CARE EDUCATION/TRAINING PROGRAM

## 2024-12-09 PROCEDURE — 6370000000 HC RX 637 (ALT 250 FOR IP): Performed by: INTERNAL MEDICINE

## 2024-12-09 PROCEDURE — 85652 RBC SED RATE AUTOMATED: CPT

## 2024-12-09 PROCEDURE — 6360000002 HC RX W HCPCS

## 2024-12-09 PROCEDURE — 70551 MRI BRAIN STEM W/O DYE: CPT

## 2024-12-09 PROCEDURE — 85025 COMPLETE CBC W/AUTO DIFF WBC: CPT

## 2024-12-09 PROCEDURE — 85379 FIBRIN DEGRADATION QUANT: CPT

## 2024-12-09 PROCEDURE — 93010 ELECTROCARDIOGRAM REPORT: CPT | Performed by: INTERNAL MEDICINE

## 2024-12-09 PROCEDURE — 6360000004 HC RX CONTRAST MEDICATION: Performed by: STUDENT IN AN ORGANIZED HEALTH CARE EDUCATION/TRAINING PROGRAM

## 2024-12-09 PROCEDURE — 99222 1ST HOSP IP/OBS MODERATE 55: CPT | Performed by: STUDENT IN AN ORGANIZED HEALTH CARE EDUCATION/TRAINING PROGRAM

## 2024-12-09 PROCEDURE — 86140 C-REACTIVE PROTEIN: CPT

## 2024-12-09 PROCEDURE — 70552 MRI BRAIN STEM W/DYE: CPT

## 2024-12-09 PROCEDURE — 81003 URINALYSIS AUTO W/O SCOPE: CPT

## 2024-12-09 RX ORDER — BUDESONIDE AND FORMOTEROL FUMARATE DIHYDRATE 160; 4.5 UG/1; UG/1
2 AEROSOL RESPIRATORY (INHALATION) 2 TIMES DAILY
Status: ON HOLD | COMMUNITY
End: 2024-12-09 | Stop reason: ALTCHOICE

## 2024-12-09 RX ORDER — TRAZODONE HYDROCHLORIDE 100 MG/1
100 TABLET ORAL NIGHTLY
Status: DISCONTINUED | OUTPATIENT
Start: 2024-12-10 | End: 2024-12-11 | Stop reason: HOSPADM

## 2024-12-09 RX ORDER — INSULIN LISPRO 100 [IU]/ML
0-8 INJECTION, SOLUTION INTRAVENOUS; SUBCUTANEOUS
Status: DISCONTINUED | OUTPATIENT
Start: 2024-12-09 | End: 2024-12-11 | Stop reason: HOSPADM

## 2024-12-09 RX ORDER — ACETAMINOPHEN 325 MG/1
650 TABLET ORAL EVERY 4 HOURS PRN
Status: DISCONTINUED | OUTPATIENT
Start: 2024-12-09 | End: 2024-12-11 | Stop reason: HOSPADM

## 2024-12-09 RX ORDER — BUDESONIDE AND FORMOTEROL FUMARATE DIHYDRATE 160; 4.5 UG/1; UG/1
2 AEROSOL RESPIRATORY (INHALATION) 2 TIMES DAILY
Status: DISCONTINUED | OUTPATIENT
Start: 2024-12-09 | End: 2024-12-09

## 2024-12-09 RX ORDER — SODIUM CHLORIDE 0.9 % (FLUSH) 0.9 %
5-40 SYRINGE (ML) INJECTION PRN
Status: DISCONTINUED | OUTPATIENT
Start: 2024-12-09 | End: 2024-12-11 | Stop reason: HOSPADM

## 2024-12-09 RX ORDER — BUSPIRONE HYDROCHLORIDE 5 MG/1
5 TABLET ORAL 3 TIMES DAILY
Status: DISCONTINUED | OUTPATIENT
Start: 2024-12-09 | End: 2024-12-11 | Stop reason: HOSPADM

## 2024-12-09 RX ORDER — CLOPIDOGREL BISULFATE 75 MG/1
75 TABLET ORAL DAILY
Status: DISCONTINUED | OUTPATIENT
Start: 2024-12-09 | End: 2024-12-09 | Stop reason: ALTCHOICE

## 2024-12-09 RX ORDER — TRAZODONE HYDROCHLORIDE 50 MG/1
50 TABLET, FILM COATED ORAL
Status: DISPENSED | OUTPATIENT
Start: 2024-12-09 | End: 2024-12-10

## 2024-12-09 RX ORDER — POLYETHYLENE GLYCOL 3350 17 G/17G
17 POWDER, FOR SOLUTION ORAL DAILY PRN
Status: DISCONTINUED | OUTPATIENT
Start: 2024-12-09 | End: 2024-12-11 | Stop reason: HOSPADM

## 2024-12-09 RX ORDER — ONDANSETRON 2 MG/ML
4 INJECTION INTRAMUSCULAR; INTRAVENOUS EVERY 6 HOURS PRN
Status: DISCONTINUED | OUTPATIENT
Start: 2024-12-09 | End: 2024-12-11 | Stop reason: HOSPADM

## 2024-12-09 RX ORDER — SODIUM CHLORIDE 9 MG/ML
INJECTION, SOLUTION INTRAVENOUS PRN
Status: DISCONTINUED | OUTPATIENT
Start: 2024-12-09 | End: 2024-12-11 | Stop reason: HOSPADM

## 2024-12-09 RX ORDER — ONDANSETRON 4 MG/1
4 TABLET, ORALLY DISINTEGRATING ORAL EVERY 8 HOURS PRN
Status: DISCONTINUED | OUTPATIENT
Start: 2024-12-09 | End: 2024-12-11 | Stop reason: HOSPADM

## 2024-12-09 RX ORDER — FUROSEMIDE 40 MG/1
40 TABLET ORAL 2 TIMES DAILY
Status: DISCONTINUED | OUTPATIENT
Start: 2024-12-09 | End: 2024-12-11 | Stop reason: HOSPADM

## 2024-12-09 RX ORDER — GLUCAGON 1 MG/ML
1 KIT INJECTION PRN
Status: DISCONTINUED | OUTPATIENT
Start: 2024-12-09 | End: 2024-12-11 | Stop reason: HOSPADM

## 2024-12-09 RX ORDER — ISOSORBIDE MONONITRATE 60 MG/1
60 TABLET, EXTENDED RELEASE ORAL DAILY
Status: DISCONTINUED | OUTPATIENT
Start: 2024-12-09 | End: 2024-12-11 | Stop reason: HOSPADM

## 2024-12-09 RX ORDER — ALBUTEROL SULFATE 90 UG/1
2 INHALANT RESPIRATORY (INHALATION) EVERY 6 HOURS PRN
COMMUNITY

## 2024-12-09 RX ORDER — BUSPIRONE HYDROCHLORIDE 5 MG/1
5 TABLET ORAL 3 TIMES DAILY
COMMUNITY

## 2024-12-09 RX ORDER — TRAZODONE HYDROCHLORIDE 100 MG/1
100 TABLET ORAL NIGHTLY
Status: DISCONTINUED | OUTPATIENT
Start: 2024-12-09 | End: 2024-12-09

## 2024-12-09 RX ORDER — DEXTROSE MONOHYDRATE 100 MG/ML
INJECTION, SOLUTION INTRAVENOUS CONTINUOUS PRN
Status: DISCONTINUED | OUTPATIENT
Start: 2024-12-09 | End: 2024-12-11 | Stop reason: HOSPADM

## 2024-12-09 RX ORDER — CLOPIDOGREL BISULFATE 75 MG/1
75 TABLET ORAL DAILY
Status: ON HOLD | COMMUNITY
End: 2024-12-09 | Stop reason: ALTCHOICE

## 2024-12-09 RX ORDER — ASPIRIN 81 MG/1
81 TABLET ORAL DAILY
Status: DISCONTINUED | OUTPATIENT
Start: 2024-12-09 | End: 2024-12-11 | Stop reason: HOSPADM

## 2024-12-09 RX ORDER — TRAZODONE HYDROCHLORIDE 50 MG/1
50 TABLET, FILM COATED ORAL NIGHTLY
Status: DISCONTINUED | OUTPATIENT
Start: 2024-12-09 | End: 2024-12-09

## 2024-12-09 RX ORDER — ENOXAPARIN SODIUM 100 MG/ML
30 INJECTION SUBCUTANEOUS 2 TIMES DAILY
Status: DISCONTINUED | OUTPATIENT
Start: 2024-12-09 | End: 2024-12-09

## 2024-12-09 RX ORDER — PANTOPRAZOLE SODIUM 40 MG/1
40 TABLET, DELAYED RELEASE ORAL DAILY
Status: DISCONTINUED | OUTPATIENT
Start: 2024-12-09 | End: 2024-12-09

## 2024-12-09 RX ORDER — NIFEDIPINE 30 MG/1
60 TABLET, EXTENDED RELEASE ORAL 2 TIMES DAILY
Status: DISCONTINUED | OUTPATIENT
Start: 2024-12-09 | End: 2024-12-11 | Stop reason: HOSPADM

## 2024-12-09 RX ORDER — LORAZEPAM 2 MG/ML
0.5 INJECTION INTRAMUSCULAR
Status: COMPLETED | OUTPATIENT
Start: 2024-12-09 | End: 2024-12-09

## 2024-12-09 RX ORDER — LOSARTAN POTASSIUM 100 MG/1
100 TABLET ORAL DAILY
Status: DISCONTINUED | OUTPATIENT
Start: 2024-12-09 | End: 2024-12-11 | Stop reason: HOSPADM

## 2024-12-09 RX ORDER — CARVEDILOL 25 MG/1
50 TABLET ORAL 2 TIMES DAILY
Status: DISCONTINUED | OUTPATIENT
Start: 2024-12-09 | End: 2024-12-11 | Stop reason: HOSPADM

## 2024-12-09 RX ORDER — SODIUM CHLORIDE 0.9 % (FLUSH) 0.9 %
5-40 SYRINGE (ML) INJECTION EVERY 12 HOURS SCHEDULED
Status: DISCONTINUED | OUTPATIENT
Start: 2024-12-09 | End: 2024-12-11 | Stop reason: HOSPADM

## 2024-12-09 RX ORDER — POTASSIUM CHLORIDE 1500 MG/1
20 TABLET, EXTENDED RELEASE ORAL 2 TIMES DAILY
Status: ON HOLD | COMMUNITY
End: 2024-12-10 | Stop reason: HOSPADM

## 2024-12-09 RX ORDER — ATORVASTATIN CALCIUM 40 MG/1
40 TABLET, FILM COATED ORAL DAILY
Status: DISCONTINUED | OUTPATIENT
Start: 2024-12-09 | End: 2024-12-11 | Stop reason: HOSPADM

## 2024-12-09 RX ORDER — SPIRONOLACTONE 25 MG/1
25 TABLET ORAL 2 TIMES DAILY
Status: DISCONTINUED | OUTPATIENT
Start: 2024-12-09 | End: 2024-12-11 | Stop reason: HOSPADM

## 2024-12-09 RX ORDER — QUETIAPINE FUMARATE 100 MG/1
100 TABLET, FILM COATED ORAL NIGHTLY
Status: DISCONTINUED | OUTPATIENT
Start: 2024-12-09 | End: 2024-12-11 | Stop reason: HOSPADM

## 2024-12-09 RX ORDER — LORAZEPAM 2 MG/ML
1 INJECTION INTRAMUSCULAR
Status: COMPLETED | OUTPATIENT
Start: 2024-12-09 | End: 2024-12-09

## 2024-12-09 RX ORDER — ASPIRIN 81 MG/1
81 TABLET ORAL DAILY
COMMUNITY

## 2024-12-09 RX ORDER — POTASSIUM CHLORIDE 1500 MG/1
20 TABLET, EXTENDED RELEASE ORAL 2 TIMES DAILY
Status: DISCONTINUED | OUTPATIENT
Start: 2024-12-09 | End: 2024-12-11 | Stop reason: HOSPADM

## 2024-12-09 RX ORDER — ALBUTEROL SULFATE 90 UG/1
2 INHALANT RESPIRATORY (INHALATION) EVERY 6 HOURS PRN
Status: DISCONTINUED | OUTPATIENT
Start: 2024-12-09 | End: 2024-12-11 | Stop reason: HOSPADM

## 2024-12-09 RX ADMIN — APIXABAN 5 MG: 5 TABLET, FILM COATED ORAL at 19:58

## 2024-12-09 RX ADMIN — EMPAGLIFLOZIN 10 MG: 10 TABLET, FILM COATED ORAL at 10:06

## 2024-12-09 RX ADMIN — ATORVASTATIN CALCIUM 40 MG: 40 TABLET, FILM COATED ORAL at 10:06

## 2024-12-09 RX ADMIN — GADOTERIDOL 20 ML: 279.3 INJECTION, SOLUTION INTRAVENOUS at 19:11

## 2024-12-09 RX ADMIN — BUSPIRONE HYDROCHLORIDE 5 MG: 5 TABLET ORAL at 19:56

## 2024-12-09 RX ADMIN — SODIUM CHLORIDE, PRESERVATIVE FREE 10 ML: 5 INJECTION INTRAVENOUS at 10:09

## 2024-12-09 RX ADMIN — NIFEDIPINE 60 MG: 30 TABLET, FILM COATED, EXTENDED RELEASE ORAL at 10:06

## 2024-12-09 RX ADMIN — CARIPRAZINE 1.5 MG: 1.5 CAPSULE, GELATIN COATED ORAL at 10:07

## 2024-12-09 RX ADMIN — QUETIAPINE FUMARATE 100 MG: 100 TABLET ORAL at 19:56

## 2024-12-09 RX ADMIN — POTASSIUM CHLORIDE 20 MEQ: 1500 TABLET, EXTENDED RELEASE ORAL at 10:05

## 2024-12-09 RX ADMIN — SPIRONOLACTONE 25 MG: 25 TABLET ORAL at 03:50

## 2024-12-09 RX ADMIN — FUROSEMIDE 40 MG: 40 TABLET ORAL at 10:06

## 2024-12-09 RX ADMIN — LORAZEPAM 1 MG: 2 INJECTION INTRAMUSCULAR; INTRAVENOUS at 10:47

## 2024-12-09 RX ADMIN — TRAZODONE HYDROCHLORIDE 50 MG: 50 TABLET ORAL at 19:56

## 2024-12-09 RX ADMIN — BUSPIRONE HYDROCHLORIDE 5 MG: 5 TABLET ORAL at 14:38

## 2024-12-09 RX ADMIN — BUSPIRONE HYDROCHLORIDE 5 MG: 5 TABLET ORAL at 10:05

## 2024-12-09 RX ADMIN — LORAZEPAM 0.5 MG: 2 INJECTION INTRAMUSCULAR; INTRAVENOUS at 17:35

## 2024-12-09 RX ADMIN — POTASSIUM CHLORIDE 20 MEQ: 1500 TABLET, EXTENDED RELEASE ORAL at 19:56

## 2024-12-09 RX ADMIN — APIXABAN 5 MG: 5 TABLET, FILM COATED ORAL at 10:07

## 2024-12-09 RX ADMIN — CARVEDILOL 50 MG: 25 TABLET, FILM COATED ORAL at 19:57

## 2024-12-09 RX ADMIN — ISOSORBIDE MONONITRATE 60 MG: 60 TABLET, EXTENDED RELEASE ORAL at 10:07

## 2024-12-09 RX ADMIN — SODIUM CHLORIDE, PRESERVATIVE FREE 10 ML: 5 INJECTION INTRAVENOUS at 20:32

## 2024-12-09 RX ADMIN — SERTRALINE HYDROCHLORIDE 50 MG: 50 TABLET ORAL at 10:06

## 2024-12-09 RX ADMIN — LOSARTAN POTASSIUM 100 MG: 100 TABLET, FILM COATED ORAL at 10:06

## 2024-12-09 RX ADMIN — CARVEDILOL 50 MG: 25 TABLET, FILM COATED ORAL at 10:05

## 2024-12-09 RX ADMIN — SPIRONOLACTONE 25 MG: 25 TABLET ORAL at 10:06

## 2024-12-09 RX ADMIN — ACETAMINOPHEN 650 MG: 325 TABLET ORAL at 19:11

## 2024-12-09 RX ADMIN — NIFEDIPINE 60 MG: 30 TABLET, FILM COATED, EXTENDED RELEASE ORAL at 19:58

## 2024-12-09 ASSESSMENT — PAIN SCALES - GENERAL: PAINLEVEL_OUTOF10: 8

## 2024-12-09 ASSESSMENT — PAIN DESCRIPTION - LOCATION: LOCATION: HEAD

## 2024-12-09 ASSESSMENT — PAIN DESCRIPTION - ORIENTATION: ORIENTATION: RIGHT;LEFT

## 2024-12-09 ASSESSMENT — PAIN DESCRIPTION - DESCRIPTORS: DESCRIPTORS: ACHING

## 2024-12-09 ASSESSMENT — PAIN - FUNCTIONAL ASSESSMENT: PAIN_FUNCTIONAL_ASSESSMENT: ACTIVITIES ARE NOT PREVENTED

## 2024-12-09 NOTE — PROGRESS NOTES
Progress Note    Admit Date:  12/8/2024    Subjective:  Mr. Parker found sitting in bed. Reports episode where his vision went black in both eyes yesterday, lasted about 5 seconds. Still having blurry vision of right eye, left eye is fine.  Chronic weakness in the bilateral knees.  No CP, SOB, nausea, abdominal pain.    Objective:   BP (!) 174/81   Pulse 55   Temp 97.3 °F (36.3 °C) (Oral)   Resp 18   Ht 1.829 m (6')   Wt (!) 136.9 kg (301 lb 14.4 oz)   SpO2 93%   BMI 40.95 kg/m²        Intake/Output Summary (Last 24 hours) at 12/9/2024 0815  Last data filed at 12/9/2024 0729  Gross per 24 hour   Intake 360 ml   Output --   Net 360 ml       Physical Exam:   Gen: No distress. Alert. Chronically ill-appearing. Obese.  Eyes: PERRL. No sclera icterus. No conjunctival injection.   ENT: No discharge. Pharynx clear.   Neck: No JVD.  No Carotid Bruit. Trachea midline.  Resp: No accessory muscle use. No crackles. No wheezes. No rhonchi.   CV: Regular rate. Regular rhythm. No murmur.  No rub. No edema.   Capillary Refill: Brisk,< 3 seconds   Peripheral Pulses: +2 palpable, equal bilaterally   GI: Non-tender. Non-distended. No masses. No organomegaly. Normal bowel sounds. No hernia.   Skin: Warm and dry. No nodule on exposed extremities. No rash on exposed extremities.   M/S: No cyanosis. No joint deformity. No clubbing.   Neuro: Awake. Grossly nonfocal    Psych: Oriented x 3. No anxiety or agitation.       Medications:  sodium chloride flush, 5-40 mL, 2 times per day  apixaban, 5 mg, BID  aspirin, 81 mg, Daily  atorvastatin, 40 mg, Daily  budesonide-formoterol, 2 puff, BID  busPIRone, 5 mg, TID  cariprazine hcl, 1.5 mg, Daily  carvedilol, 25 mg, BID  clopidogrel, 75 mg, Daily  empagliflozin, 10 mg, Daily  furosemide, 40 mg, Daily  pantoprazole, 40 mg, Daily  potassium chloride, 20 mEq, BID  NIFEdipine, 60 mg, BID  QUEtiapine, 100 mg, Nightly  sertraline, 50 mg, Daily  spironolactone, 25 mg, BID  traZODone, 50 mg,

## 2024-12-09 NOTE — H&P
Eliquis    #History of diabetes  Resume insulin regimen  Blood sugar checks before every meal and nightly    #Hypertension  Resume home antihypertensive    #GERD  On PPI    DVT prophylaxis: eliquis    Diet: REGULAR  Code Status: FULL    Consults:  None    Disposition:  Admit to Inpatient   ELOS:  Greater than two midnights due to medical therapy     Please note that portions of this note were completed with a voice recognition program.  Efforts were made to edit the dictations but occasionally words are mis-transcribed.)     Ishaan Price MD

## 2024-12-09 NOTE — FLOWSHEET NOTE
12/09/24 1000   Vital Signs   Pulse 54   Heart Rate Source Monitor   BP (!) 172/74   MAP (Calculated) 107   BP Location Right upper arm   BP Method Automatic   Patient Position Sitting;Up in chair   Pain Assessment   Pain Assessment None - Denies Pain     AM assessment complete. Pt a&o x4. Denies any pain or discomfort. Denies any sob. LCTA. Trace edema to ble. Ativan ordered for MRI around 1200. He did inquire why his metformin was being held. Explained how he had contrast yesterday and metformin can react to contrast and damage kidneys. Call light and bedside table within reach.

## 2024-12-09 NOTE — PROGRESS NOTES
Pt c/o headache. He has no PRN orders for anything to help subside pain. PerfectServe sent to Dr El to see about getting something.

## 2024-12-09 NOTE — ED PROVIDER NOTES
[facility] ED  EMERGENCY DEPARTMENT ENCOUNTER        Patient Name: Venkata Parker  MRN: [id number]  Birthdate 1975  Date of evaluation: 12/8/2024  Provider: Belén Gautam MD  PCP: Ana Cortez  Note Started: 10:03 PM EST 12/8/24      CHIEF COMPLAINT  Headache, vision change         HISTORY & PHYSICAL     HISTORY OF PRESENT ILLNESS  History from : Patient    Limitations to history : None    Venkata Parker is a 49 y.o. male  has a past medical history of Atrial fibrillation ([facility]), CHF (congestive heart failure) ([facility]), Diabetes mellitus ([facility]), and Hypertension., who presents to the ED complaining of headache, blurry vision, dizziness.  Patient was seen at [facility] earlier today and had stroke workup with plan to admit but left AGAINST MEDICAL ADVICE.  Headache started yesterday 12/7 at 6 AM.  Denies any trauma, he is on Eliquis for atrial fibrillation patient reports he has a known history of glaucoma in his left eye and has been referred to a eye specialist. Also feels dizzy.  Able to walk ok. No chest pain or shortness of breath.  No vomiting or diarrhea.  No abdominal pain.  Currently residing at the [facility] for drug treatment previous methamphetamine use.  After leaving AGAINST MEDICAL ADVICE, patient went back to the [facility] and spoke to some of his friends who recommended he be evaluated further    Old records reviewed: Patient had an NIH stroke scale of 0 at [facility].  His ocular pressures were unremarkable.  Negative fluorescein staining.    Ct Head: IMPRESSION:  1. No acute intracranial finding.  2. Focal encephalomalacia in the right occipital lobe with compensatory  enlargement of the right lateral ventricle.  Suspected remote area of  infarct.  Correlate with neurologic history and consider follow-up MRI given  acute on set of symptoms.    CTA: IMPRESSION:  Severe focal stenosis in the mid P2 segment of the left PCA.     Hypoplastic right PCA.     Moderate stenosis in  severe sepsis or septic shock?   No   Exclusion criteria - the patient is NOT to be included for SEP-1 Core Measure due to:  2+ SIRS criteria are not met      TREATMENT:  During the patient's ED course, the patient was given:  Medications - No data to display     REASSESSMENT:  On reassessment, Patient is well-appearing, nontoxic, no acute distress.  Patient representing after leaving AGAINST MEDICAL ADVICE when he was supposed to be admitted earlier today for strokelike symptoms.  Patient denies any change in his symptoms compared to earlier today.  NIH stroke scale of 1.At this time, do feel the patient requires admission for further work-up and management.  Patient agrees to admission. Discussed the patient with hospital team, Dr Price, patient to be admitted to [facility].      Vitals:    Vitals:    12/08/24 2133   BP: (!) 158/82   Pulse: 65   Resp: 16   Temp: 97.9 °F (36.6 °C)   TempSrc: Oral   SpO2: 93%   Weight: (!) 137.3 kg (302 lb 12.8 oz)   Height: 1.85 m (6' 0.84\")       CRITICAL CARE TIME     I personally spent a total of 0 minutes of critical care time in obtaining history, performing a physical exam, bedside monitoring of interventions, collecting and interpreting tests and discussion with consultants but excluding time spent performing procedures, treating other patients and teaching time.                   FINAL IMPRESSION      1. Vision changes    2. Acute nonintractable headache, unspecified headache type    3. Dizziness    4. Abnormal head CT          DISPOSITION/PLAN   Disposition: DISPOSITION Admitted 12/08/2024 10:57:33 PM   DISPOSITION CONDITION Stable         DISCLAIMER: This chart was created using Dragon dictation software.  Efforts were made by me to ensure accuracy, however some errors may be present due to limitations of this technology and occasionally words are not transcribed correctly.    MD Dain Payan, Belén DO MD  12/08/24 1095

## 2024-12-09 NOTE — CARE COORDINATION
Case Management Assessment  Initial Evaluation    Date/Time of Evaluation: 12/9/2024 7:58 AM  Assessment Completed by: Harman Ma RN    If patient is discharged prior to next notation, then this note serves as note for discharge by case management.    Patient Name: Venkata Parker                   YOB: 1975  Diagnosis: Dizziness [R42]  Abnormal head CT [R93.0]  Vision changes [H53.9]  Stroke of unknown cause (HCC) [I63.9]  Acute nonintractable headache, unspecified headache type [R51.9]                   Date / Time: 12/8/2024  9:34 PM    Patient Admission Status: Inpatient   Readmission Risk (Low < 19, Mod (19-27), High > 27): Readmission Risk Score: 17    Current PCP: Ana Cortez  PCP verified by CM? Yes    Chart Reviewed: Yes      History Provided by: Patient  Patient Orientation: Alert and Oriented    Patient Cognition: Alert    Hospitalization in the last 30 days (Readmission):  Yes    If yes, Readmission Assessment in CM Navigator will be completed.    Advance Directives:      Code Status: Full Code   Patient's Primary Decision Maker is: Legal Next of Kin      Discharge Planning:    Patient lives with: Alone Type of Home: Other (Comment) (back to [facility])  Primary Care Giver: Self  Patient Support Systems include: Friends/Neighbors   Current Financial resources: Medicaid  Current community resources: [employer]  Current services prior to admission: None            Current DME:              Type of Home Care services:  None    ADLS  Prior functional level: Independent in ADLs/IADLs  Current functional level: Independent in ADLs/IADLs    PT AM-PAC:   /24  OT AM-PAC:   /24    Family can provide assistance at DC: Yes  Would you like Case Management to discuss the discharge plan with any other family members/significant others, and if so, who? No  Plans to Return to Present Housing: Yes  Other Identified Issues/Barriers to RETURNING to current housing: na  Potential Assistance needed

## 2024-12-09 NOTE — PROGRESS NOTES
Pt refusing for lab to draw bloods. This writer was able to draw from PIV. Called lab and spoke with Loretta in regards to them being drawn but she stated that new orders would need placed. Lab ordered redone.

## 2024-12-09 NOTE — CONSULTS
Clinical Pharmacy Progress Note  Medication History     Admit Date: 12/8/24    Pharmacy has been consulted to review this patient's home medication list by ED RN.    List of current medications the patient is taking is complete, and home medication list in Epic has been updated to reflect the changes noted below.    Source(s) of information: SureScripts, Encounter Notes, OARRS, [facility] Continuity of Care Document, and Patient      Current Outpatient Medications on File Prior to Encounter   Medication Sig Notes    clopidogrel (PLAVIX) 75 MG tablet Take 1 tablet by mouth daily This was last filled for a 28-day supply on 11/6. [facility] told pt to DISCONTINUE taking this on 9/19/24. Per notes from PCP, pt was to continue plavix instead of ASA.     empagliflozin (JARDIANCE) 10 MG tablet Take 1 tablet by mouth daily     metFORMIN (GLUCOPHAGE) 1000 MG tablet Take 0.5-1 tablets by mouth 2 times daily (with meals) (Pt takes 1000 mg (2 tablets) with breakfast and 500 mg (1 tablet) with dinner)     sertraline (ZOLOFT) 50 MG tablet Take 1 tablet by mouth daily     potassium chloride (KLOR-CON M) 20 MEQ extended release tablet Take 1 tablet by mouth 2 times daily     cariprazine hcl (VRAYLAR) 1.5 MG capsule Take 1 capsule by mouth daily     albuterol sulfate HFA (VENTOLIN HFA) 108 (90 Base) MCG/ACT inhaler Inhale 2 puffs into the lungs every 6 hours as needed for Wheezing     busPIRone (BUSPAR) 5 MG tablet Take 1 tablet by mouth 3 times daily     QUEtiapine (SEROQUEL) 100 MG tablet Take 1 tablet by mouth nightly     apixaban (ELIQUIS) 5 MG TABS tablet Take 1 tablet by mouth 2 times daily     atorvastatin (LIPITOR) 40 MG tablet Take 1 tablet by mouth daily     carvedilol (COREG) 25 MG tablet Take 2 tablets by mouth 2 times daily (with meals)     furosemide (LASIX) 40 MG tablet Take 1 tablet by mouth 2 times daily (Takes 1 tablet at 0600 and 1 tablet at 1200)     isosorbide mononitrate (IMDUR) 60 MG extended release

## 2024-12-09 NOTE — PROGRESS NOTES
12/09/24 0500   RT Protocol   History Pulmonary Disease 1   Respiratory pattern 0   Breath sounds 2   Cough 0   Indications for Bronchodilator Therapy None   Bronchodilator Assessment Score 3     RT Inhaler-Nebulizer Bronchodilator Protocol Note    There is a bronchodilator order in the chart from a provider indicating to follow the RT Bronchodilator Protocol and there is an “Initiate RT Inhaler-Nebulizer Bronchodilator Protocol” order as well (see protocol at bottom of note).    CXR Findings:  XR CHEST PORTABLE    Result Date: 12/8/2024  No acute cardiopulmonary process. Mild cardiomegaly.       The findings from the last RT Protocol Assessment were as follows:   History Pulmonary Disease: Smoker 15 pack years or more  Respiratory Pattern: Regular pattern and RR 12-20 bpm  Breath Sounds: Slightly diminished and/or crackles  Cough: Strong, spontaneous, non-productive  Indication for Bronchodilator Therapy: None  Bronchodilator Assessment Score: 3    Aerosolized bronchodilator medication orders have been revised according to the RT Inhaler-Nebulizer Bronchodilator Protocol below.    Respiratory Therapist to perform RT Therapy Protocol Assessment initially then follow the protocol.  Repeat RT Therapy Protocol Assessment PRN for score 0-3 or on second treatment, BID, and PRN for scores above 3.    No Indications - adjust the frequency to every 6 hours PRN wheezing or bronchospasm, if no treatments needed after 48 hours then discontinue using Per Protocol order mode.     If indication present, adjust the RT bronchodilator orders based on the Bronchodilator Assessment Score as indicated below.  Use Inhaler orders unless patient has one or more of the following: on home nebulizer, not able to hold breath for 10 seconds, is not alert and oriented, cannot activate and use MDI correctly, or respiratory rate 25 breaths per minute or more, then use the equivalent nebulizer order(s) with same Frequency and PRN reasons based

## 2024-12-09 NOTE — CONSULTS
Inpatient Neurology consult        [facility] Neurology            Venkata Parker  1975    Date of Service: 12/9/2024    Referring Physician: Ni El DO      Reason for the consult and CC: stroke    HPI:   The patient is a 49 y.o. male with a past medical history of atrial fibrillation maintained on Eliquis, CHF, diabetes, hypertension, hyperlipidemia, and bipolar disorder originally presenting to the hospital for concerns of visual changes and headache. Patient endorse a aching headache in bilateral temples rated a 10/10 when moving head and 5/10 when head is stable for the past two days. Patient states that yesterday morning he had a brief episode of loss of vision in bilateral eyes lasting approximately 5 seconds and he continued to have \"blotchy\" vision in bilateral eyes. Patient reports 1 headache every 6 months and describes the pain with those headache as similar to today's but denies any history of visual involvement. He denies any unilateral weakness, dizziness, auditory changes, slurred speech, or recent illnesses.  Patient reports he takes Plavix and Eliquis at home for his CHF and atrial fibrillation.  Per chart review, patient was instructed to stop taking Plavix and continue aspirin 81 mg at discharge from [facility] in September, however, patient states his PCP instructed him to stop taking the ASA and continue Eliquis and Plavix. Patient states he has been taking all three medications since September. Neurology has been consulted for further evaluation and management of possible stroke.       Constitutional:   Vitals:    12/09/24 0008 12/09/24 0343 12/09/24 0720 12/09/24 1000   BP: (!) 180/81 (!) 174/78 (!) 174/81 (!) 172/74   Pulse: 60 62 55 54   Resp: 18 18 18    Temp: 97.7 °F (36.5 °C) 97.1 °F (36.2 °C) 97.3 °F (36.3 °C)    TempSrc: Oral Oral Oral    SpO2: 92% 94% 93%    Weight: (!) 136.9 kg (301 lb 14.4 oz)      Height: 1.829 m (6')            I personally reviewed

## 2024-12-09 NOTE — PROGRESS NOTES
Speech Language Pathology  Attempt Note     Name: Venkata Parker  : 1975  Medical Diagnosis: Dizziness [R42]  Abnormal head CT [R93.0]  Vision changes [H53.9]  Stroke of unknown cause (HCC) [I63.9]  Acute nonintractable headache, unspecified headache type [R51.9]      SLP attempted to see pt for bedside swallow evaluation (BSE). Order acknowledged and chart reviewed for completion of eval. SLP spoke with Debbie/RN who reports pt eating regular diet with no concerns re:swallowing or speech/language. Order acknowledged and chart reviewed for completion of eval. SLP educates pt in role of SLP in speech/language/cognitive/swallowing. Pt denies changes from baseline in any of the above domains. Pt is eating and drinking preferred consistencies and taking meds without difficulty. Evaluation / treatment not completed as pt denies need for ST intervention . SLP instructs pt to notify physician if new needs arise. Pt/family voice good understanding of information discussed. RN aware. No charges.      Thank you,    Jasmyn Moses M.A. SLP  Speech-Language Pathologist  [place] Lic #[id number]  [phone]

## 2024-12-09 NOTE — PROGRESS NOTES
Occupational Therapy and Physical Therapy    Order received and chart reviewed. Therapist met with patient this morning. Patient reports independence with all ADLs and mobility. Weakness in B LEs has resolved. No acute OT/PT needs at this time. Will discontinue order. Please re-order if new issues arise while in house.       Yasemin Valdovinos, OTR/L #[id number]  Ney Briggs, PT

## 2024-12-10 PROBLEM — R51.9 ACUTE NONINTRACTABLE HEADACHE: Status: ACTIVE | Noted: 2024-12-10

## 2024-12-10 PROBLEM — H53.9 MONOCULAR VISUAL DISTURBANCE: Status: ACTIVE | Noted: 2024-12-10

## 2024-12-10 LAB
ANION GAP SERPL CALCULATED.3IONS-SCNC: 6 MMOL/L (ref 3–16)
BASOPHILS # BLD: 0.1 K/UL (ref 0–0.2)
BASOPHILS NFR BLD: 1 %
BUN SERPL-MCNC: 23 MG/DL (ref 7–20)
CALCIUM SERPL-MCNC: 7.9 MG/DL (ref 8.3–10.6)
CHLORIDE SERPL-SCNC: 103 MMOL/L (ref 99–110)
CO2 SERPL-SCNC: 25 MMOL/L (ref 21–32)
CREAT SERPL-MCNC: 0.7 MG/DL (ref 0.9–1.3)
DEPRECATED RDW RBC AUTO: 18.6 % (ref 12.4–15.4)
EOSINOPHIL # BLD: 0.2 K/UL (ref 0–0.6)
EOSINOPHIL NFR BLD: 2.6 %
GFR SERPLBLD CREATININE-BSD FMLA CKD-EPI: >90 ML/MIN/{1.73_M2}
GLUCOSE BLD-MCNC: 109 MG/DL (ref 70–99)
GLUCOSE BLD-MCNC: 139 MG/DL (ref 70–99)
GLUCOSE BLD-MCNC: 150 MG/DL (ref 70–99)
GLUCOSE BLD-MCNC: 194 MG/DL (ref 70–99)
GLUCOSE BLD-MCNC: 265 MG/DL (ref 70–99)
GLUCOSE SERPL-MCNC: 164 MG/DL (ref 70–99)
HCT VFR BLD AUTO: 43 % (ref 40.5–52.5)
HGB BLD-MCNC: 14.1 G/DL (ref 13.5–17.5)
LYMPHOCYTES # BLD: 1.6 K/UL (ref 1–5.1)
LYMPHOCYTES NFR BLD: 21.6 %
MCH RBC QN AUTO: 25.7 PG (ref 26–34)
MCHC RBC AUTO-ENTMCNC: 32.7 G/DL (ref 31–36)
MCV RBC AUTO: 78.7 FL (ref 80–100)
MONOCYTES # BLD: 0.8 K/UL (ref 0–1.3)
MONOCYTES NFR BLD: 10.4 %
NEUTROPHILS # BLD: 4.9 K/UL (ref 1.7–7.7)
NEUTROPHILS NFR BLD: 64.4 %
PERFORMED ON: ABNORMAL
PLATELET # BLD AUTO: 188 K/UL (ref 135–450)
PMV BLD AUTO: 7.7 FL (ref 5–10.5)
POTASSIUM SERPL-SCNC: 4.1 MMOL/L (ref 3.5–5.1)
RBC # BLD AUTO: 5.46 M/UL (ref 4.2–5.9)
REASON FOR REJECTION: NORMAL
REJECTED TEST: NORMAL
SODIUM SERPL-SCNC: 134 MMOL/L (ref 136–145)
WBC # BLD AUTO: 7.6 K/UL (ref 4–11)

## 2024-12-10 PROCEDURE — 6370000000 HC RX 637 (ALT 250 FOR IP)

## 2024-12-10 PROCEDURE — 95816 EEG AWAKE AND DROWSY: CPT

## 2024-12-10 PROCEDURE — 85025 COMPLETE CBC W/AUTO DIFF WBC: CPT

## 2024-12-10 PROCEDURE — 99232 SBSQ HOSP IP/OBS MODERATE 35: CPT | Performed by: STUDENT IN AN ORGANIZED HEALTH CARE EDUCATION/TRAINING PROGRAM

## 2024-12-10 PROCEDURE — 80048 BASIC METABOLIC PNL TOTAL CA: CPT

## 2024-12-10 PROCEDURE — 99232 SBSQ HOSP IP/OBS MODERATE 35: CPT | Performed by: INTERNAL MEDICINE

## 2024-12-10 PROCEDURE — 6370000000 HC RX 637 (ALT 250 FOR IP): Performed by: INTERNAL MEDICINE

## 2024-12-10 PROCEDURE — 2060000000 HC ICU INTERMEDIATE R&B

## 2024-12-10 PROCEDURE — 84132 ASSAY OF SERUM POTASSIUM: CPT

## 2024-12-10 PROCEDURE — 2580000003 HC RX 258: Performed by: INTERNAL MEDICINE

## 2024-12-10 RX ORDER — TRAMADOL HYDROCHLORIDE 50 MG/1
50 TABLET ORAL
Status: COMPLETED | OUTPATIENT
Start: 2024-12-10 | End: 2024-12-10

## 2024-12-10 RX ADMIN — ISOSORBIDE MONONITRATE 60 MG: 60 TABLET, EXTENDED RELEASE ORAL at 08:58

## 2024-12-10 RX ADMIN — POTASSIUM CHLORIDE 20 MEQ: 1500 TABLET, EXTENDED RELEASE ORAL at 08:58

## 2024-12-10 RX ADMIN — NIFEDIPINE 60 MG: 30 TABLET, FILM COATED, EXTENDED RELEASE ORAL at 08:58

## 2024-12-10 RX ADMIN — LOSARTAN POTASSIUM 100 MG: 100 TABLET, FILM COATED ORAL at 08:58

## 2024-12-10 RX ADMIN — SODIUM CHLORIDE, PRESERVATIVE FREE 10 ML: 5 INJECTION INTRAVENOUS at 09:03

## 2024-12-10 RX ADMIN — CARVEDILOL 50 MG: 25 TABLET, FILM COATED ORAL at 08:58

## 2024-12-10 RX ADMIN — BUSPIRONE HYDROCHLORIDE 5 MG: 5 TABLET ORAL at 20:02

## 2024-12-10 RX ADMIN — APIXABAN 5 MG: 5 TABLET, FILM COATED ORAL at 20:02

## 2024-12-10 RX ADMIN — SODIUM CHLORIDE, PRESERVATIVE FREE 10 ML: 5 INJECTION INTRAVENOUS at 20:31

## 2024-12-10 RX ADMIN — BUSPIRONE HYDROCHLORIDE 5 MG: 5 TABLET ORAL at 08:58

## 2024-12-10 RX ADMIN — CARIPRAZINE 1.5 MG: 1.5 CAPSULE, GELATIN COATED ORAL at 08:59

## 2024-12-10 RX ADMIN — QUETIAPINE FUMARATE 100 MG: 100 TABLET ORAL at 20:03

## 2024-12-10 RX ADMIN — POTASSIUM CHLORIDE 20 MEQ: 1500 TABLET, EXTENDED RELEASE ORAL at 20:02

## 2024-12-10 RX ADMIN — EMPAGLIFLOZIN 10 MG: 10 TABLET, FILM COATED ORAL at 08:58

## 2024-12-10 RX ADMIN — TRAMADOL HYDROCHLORIDE 50 MG: 50 TABLET ORAL at 13:48

## 2024-12-10 RX ADMIN — BUSPIRONE HYDROCHLORIDE 5 MG: 5 TABLET ORAL at 13:27

## 2024-12-10 RX ADMIN — APIXABAN 5 MG: 5 TABLET, FILM COATED ORAL at 08:58

## 2024-12-10 RX ADMIN — CARVEDILOL 50 MG: 25 TABLET, FILM COATED ORAL at 20:03

## 2024-12-10 RX ADMIN — SERTRALINE HYDROCHLORIDE 50 MG: 50 TABLET ORAL at 08:58

## 2024-12-10 RX ADMIN — FUROSEMIDE 40 MG: 40 TABLET ORAL at 06:27

## 2024-12-10 RX ADMIN — ASPIRIN 81 MG: 81 TABLET, COATED ORAL at 08:58

## 2024-12-10 RX ADMIN — FUROSEMIDE 40 MG: 40 TABLET ORAL at 11:51

## 2024-12-10 RX ADMIN — ATORVASTATIN CALCIUM 40 MG: 40 TABLET, FILM COATED ORAL at 08:58

## 2024-12-10 RX ADMIN — SPIRONOLACTONE 25 MG: 25 TABLET ORAL at 08:59

## 2024-12-10 RX ADMIN — INSULIN LISPRO 2 UNITS: 100 INJECTION, SOLUTION INTRAVENOUS; SUBCUTANEOUS at 08:15

## 2024-12-10 RX ADMIN — NIFEDIPINE 60 MG: 30 TABLET, FILM COATED, EXTENDED RELEASE ORAL at 20:03

## 2024-12-10 RX ADMIN — TRAZODONE HYDROCHLORIDE 100 MG: 100 TABLET ORAL at 20:03

## 2024-12-10 ASSESSMENT — PAIN SCALES - GENERAL
PAINLEVEL_OUTOF10: 0
PAINLEVEL_OUTOF10: 9

## 2024-12-10 ASSESSMENT — PAIN DESCRIPTION - LOCATION: LOCATION: HEAD

## 2024-12-10 ASSESSMENT — PAIN DESCRIPTION - DESCRIPTORS: DESCRIPTORS: ACHING

## 2024-12-10 ASSESSMENT — PAIN - FUNCTIONAL ASSESSMENT: PAIN_FUNCTIONAL_ASSESSMENT: ACTIVITIES ARE NOT PREVENTED

## 2024-12-10 NOTE — PROGRESS NOTES
Pt aware staff unable to get old of [facility]. [employer] sent to Dr El to inform her. Pt okay with discharging in AM.

## 2024-12-10 NOTE — PROGRESS NOTES
Have attempted numerous times to get ahold of [facility] to arrange for pt to go back. Unable to get ahold of them. CM notified and they stated they would try.

## 2024-12-10 NOTE — FLOWSHEET NOTE
12/10/24 0853   Vital Signs   Temp 97.4 °F (36.3 °C)   Temp Source Oral   Pulse 55   Heart Rate Source Monitor   Respirations 18   BP (!) 141/64   MAP (Calculated) 90   BP Location Left upper arm   BP Method Automatic   Patient Position Sitting;Up in chair   Pain Assessment   Pain Assessment None - Denies Pain   Oxygen Therapy   SpO2 94 %   O2 Device None (Room air)     AM assessment complete. Pt a&o x4. C/o headache and states that tylenol is ineffective. LCTA. Trace edema to ble. Spoke with dietary to see if he could get more breakfast. He is able to get more meat. He stated that was fine he had just still felt hungry. He is hoping to get metformin back. Explained that with contrast  he has had with MRI and CT he needs to hold it for 48hrs. He states that it could do damage to kidneys but still wants it back. Call light and bedside table within reach.

## 2024-12-10 NOTE — PROGRESS NOTES
Neurology Follow-up  [facility] Neurology  Parvez Coleman MD    Date of Service: 12/10/24        Venkata Parker is a 49 y.o. male who  has a past medical history of Aortic dissection, thoracic (HCC), Atrial fibrillation (HCC), CHF (congestive heart failure) (HCC), Diabetes mellitus (HCC), and Hypertension.    Subjective:   CC: Follow up today regarding: vision loss    Events noted. Chart and lab reviewed. Today he says that he never had new vision loss. The left superior quadrant vision loss is old. The new change is sudden onset blurry vision of the right eye when looking at targets at a distance but normal when close up. This is monocular blurry vision (persists when closing left eye). Eye is not painful.     ROS : A 10-12 system review obtained and updated today and is unremarkable except as mentioned  in my interval history.     Medication, past medical history, social history, and family history reviewed.    Physical Examination    Mental status:   Alert. Fully oriented.    Recent memory normal.   Attention normal.   Language expression and comprehension normal.     Cranial nerves:  II: Visual fields were abnormal. Left homonymous superior quadrantanopsia. PERRL. No APD. Optic disc margin well delineating.   III, IV, VI: Ocular motility was full and there was no nystagmus.    V: Jaw opening was symmetric.    VII: Facial strength symmetrically intact and the speech was clear.   VIII: Hearing acuity was normal.    IX, X: Palate and cough were normal.  XI: not tested   XII: Tongue protrusion normal. No atrophy or fasciculations.     Motor: Normal muscle bulk and tone. Moving BUE and BLE symmetric at least greater than antigravity.      Coordination: No tremor, myoclonus observed. Finger nose finger testing accurate. Station and gait not tested.     Data Reviewed:   I independently interpreted brain MRI with contrast which did not reveal contrast enhancing lesion.   I reviewed the lab results. UA not indicative of  infection but glucose was very high. Otherwise unremarkable.     Assessment:   Right monocular blurry vision acute onset. Blurry vision is present at a distance but normal up close. This localizes to the right eye. Possible amaurosis fugax although it would be atypical for that. He needs to see his ophthalmologist. CTA head/neck did not reveal right ICA disease. Rule out intracardiac thrombus.    History of stroke due to arterial dissection, right PCA territory causing left homonymous superior quadrantanopsia. Also has PAF. Anticoagulation is all that is needed for secondary stroke prevention.     Recommendations:    TTE can be completed outpatient  No neurological indication for Asprin or Plavix  Continue home doses of statin and apixaban  No neurological barrier to discharge  Follow up with outpatient ophthalmologist ASAP  Follow up neurology clinic 3 months.   Neurology will sign off but please contact me if there are additional questions/issues.    Electronically signed by Santo Coleman MD on 12/10/2024 at 4:02 PM

## 2024-12-10 NOTE — PROCEDURES
INTERPRETATION:  This 25-minute, computer-assisted video EEG recording is mildly abnormal.  It showed mild degree of slowing over the posterior dominant rhythm.  No potentially epileptiform activity was present during the recording.      The EEG findings were consistent with mild degree of generalized non-specific cerebral dysfunction.    CLASSIFICATION:  Dysrhythmia grade 1, generalized.  Sleep - unsuccessful.  EKG channel.    DESCRIPTION:    BACKGROUND:  The awake recording revealed 7 - 8 Hz theta activity over the posterior head region.  There were increased 4 to 7 Hz theta activity into the EEG background.  Given the extensive study, the patient still did not sleep.  The EEG showed normal V waves during drowsiness.  There was no significant change on the EEG background with photic stimulation.  The patient was not cooperative during hyperventilation.    INTERICTAL DISCHARGES: None    CLINICAL EVENTS:  None    The EKG channel was unremarkable.

## 2024-12-10 NOTE — PROGRESS NOTES
EEG completed and available for interpretation on the [facility] database .

## 2024-12-10 NOTE — DISCHARGE SUMMARY
Hospital Medicine Discharge Summary    Patient: Venkata Parker     Gender: male  : 1975   Age: 49 y.o.  MRN: [id number]    Admitting Physician: Ishaan Price MD  Discharge Physician: Ni El,     Code Status: Full Code     Admit Date: 2024   Discharge Date:  2024     Discharge Diagnoses:    Active Hospital Problems    Diagnosis Date Noted    Monocular visual disturbance [H53.9] 12/10/2024    Acute nonintractable headache [R51.9] 12/10/2024    Left homonymous superior quadrantanopia [H53.462] 2024    History of stroke [Z86.73] 2024    Stroke of unknown cause (HCC) [I63.9] 2024       Condition at Discharge: Stable    Hospital Course:     Blurry vision, dizziness, headache.   -NIH 0 in ED.  -CT head showing focal encephalomalacia in right occipital lobe with compensatory enlargement of right lateral ventricle, suspected remote area of infarct..   -CTA head/neck severe focal stenosis in mid P2 and left PCA, moderate stenosis in proximal M2 segment of right MCA.  -Neuro checks q4 hours.  -PT/OT.  -Telemetry monitoring.   -Neurology consulted.   -NPO pending swallow eval.  -Lipid panel ordered.   -MRI ordered - no acute stroke  -patient is on Lipitor, Eliquis, and should be on ASA until he has follow-up with his cardiologist. He has upcoming appointment to discuss.      Paroxysmal atrial fibrillation.  -continue home anticoagulation with Eliquis.  -continue home Coreg.      Type II diabetes mellitus.  -SSI.  -POC Glucose, carb control diet.      HFpEF.  -echo 10/8/2024 EF 55-60%, indeterminate diastolic function.   -.  -continue Jardiance, Aldactone, lasix.  -I/Os, daily weights.      Type A dissection s/p ascending aortic aneurysm repair ().  Chronic type B aortic dissection.  -strict BP control.  -outpatient follow up with cardiology for surveillance.      Methamphetamine abuse.  -currently residing at [facility] for [employer] tx.     Hypertension.  -continue home  lymph nodes, likely reactive.  The bilateral lung apices are clear.  There is prominence of the bilateral adenoids, palatine and lingual tonsils, likely reactive.  No acute abnormality of the salivary and thyroid glands. BONES: No acute osseous abnormality. CTA HEAD: ANTERIOR CIRCULATION: There is moderate stenosis in the proximal M2 segment of the right MCA.  No significant stenosis of the intracranial internal carotid, anterior cerebral, or left middle cerebral arteries. No aneurysm. POSTERIOR CIRCULATION: There is severe focal stenosis in the mid P2 segment of the left PCA.  There is hypoplastic right PCA.  There is hypoplastic intracranial left vertebral artery.  No significant stenosis of the basilar or posterior cerebral arteries. No aneurysm. OTHER: No dural venous sinus thrombosis on this non-dedicated study. BRAIN: No mass effect or midline shift. No extra-axial fluid collection. There is old infarction in the right occipital lobe.  The gray-white differentiation is maintained.     Severe focal stenosis in the mid P2 segment of the left PCA. Hypoplastic right PCA. Moderate stenosis in the proximal M2 segment of the right MCA. No large vessel occlusion in the neck.     CT HEAD WO CONTRAST    Result Date: 12/8/2024  EXAMINATION: CT OF THE HEAD WITHOUT CONTRAST  12/8/2024 1:44 pm TECHNIQUE: CT of the head was performed without the administration of intravenous contrast. Automated exposure control, iterative reconstruction, and/or weight based adjustment of the mA/kV was utilized to reduce the radiation dose to as low as reasonably achievable. COMPARISON: None. HISTORY: ORDERING SYSTEM PROVIDED HISTORY: headache, blurry vision, dizzy TECHNOLOGIST PROVIDED HISTORY: Has a \"code stroke\" or \"stroke alert\" been called?->No Reason for exam:->headache, blurry vision, dizzy Decision Support Exception - unselect if not a suspected or confirmed emergency medical condition->Emergency Medical Condition (MA) Reason for

## 2024-12-10 NOTE — PROGRESS NOTES
PM assessment completed. Scheduled medications given per MAR. VSS room air, A/O x4. Patient stated he is refusing vitals through the night if he is sleeping although stated he would call out when awake for us to do vitals. Patient is aware staff will be rounding although will attempt to not wake him. Patient does not appear in distress and denies any needs at this time. Call light in reach, will monitor.

## 2024-12-10 NOTE — CARE COORDINATION
4:51 PM  SWK attempted several times to get in contact with [facility] staff for transport. No answer and no way to LVM.

## 2024-12-10 NOTE — PLAN OF CARE
Problem: Chronic Conditions and Co-morbidities  Goal: Patient's chronic conditions and co-morbidity symptoms are monitored and maintained or improved  Outcome: Progressing     Problem: Discharge Planning  Goal: Discharge to home or other facility with appropriate resources  Outcome: Progressing     Problem: Pain  Goal: Verbalizes/displays adequate comfort level or baseline comfort level  Outcome: Progressing     Problem: Safety - Adult  Goal: Free from fall injury  Outcome: Progressing     Problem: Neurosensory - Adult  Goal: Achieves stable or improved neurological status  Outcome: Progressing

## 2024-12-11 VITALS
DIASTOLIC BLOOD PRESSURE: 71 MMHG | HEART RATE: 56 BPM | RESPIRATION RATE: 16 BRPM | SYSTOLIC BLOOD PRESSURE: 166 MMHG | BODY MASS INDEX: 40.93 KG/M2 | HEIGHT: 72 IN | WEIGHT: 302.2 LBS | TEMPERATURE: 97 F | OXYGEN SATURATION: 96 %

## 2024-12-11 LAB
ANION GAP SERPL CALCULATED.3IONS-SCNC: 7 MMOL/L (ref 3–16)
BASOPHILS # BLD: 0.2 K/UL (ref 0–0.2)
BASOPHILS NFR BLD: 2.4 %
BUN SERPL-MCNC: 25 MG/DL (ref 7–20)
CALCIUM SERPL-MCNC: 8.4 MG/DL (ref 8.3–10.6)
CHLORIDE SERPL-SCNC: 99 MMOL/L (ref 99–110)
CO2 SERPL-SCNC: 27 MMOL/L (ref 21–32)
CREAT SERPL-MCNC: 0.8 MG/DL (ref 0.9–1.3)
DEPRECATED RDW RBC AUTO: 19 % (ref 12.4–15.4)
EOSINOPHIL # BLD: 0.2 K/UL (ref 0–0.6)
EOSINOPHIL NFR BLD: 2.7 %
GFR SERPLBLD CREATININE-BSD FMLA CKD-EPI: >90 ML/MIN/{1.73_M2}
GLUCOSE SERPL-MCNC: 126 MG/DL (ref 70–99)
HCT VFR BLD AUTO: 44.3 % (ref 40.5–52.5)
HGB BLD-MCNC: 14.5 G/DL (ref 13.5–17.5)
LYMPHOCYTES # BLD: 1.4 K/UL (ref 1–5.1)
LYMPHOCYTES NFR BLD: 17.1 %
MCH RBC QN AUTO: 25.6 PG (ref 26–34)
MCHC RBC AUTO-ENTMCNC: 32.8 G/DL (ref 31–36)
MCV RBC AUTO: 78.2 FL (ref 80–100)
MONOCYTES # BLD: 0.6 K/UL (ref 0–1.3)
MONOCYTES NFR BLD: 7.7 %
NEUTROPHILS # BLD: 5.5 K/UL (ref 1.7–7.7)
NEUTROPHILS NFR BLD: 70.1 %
PLATELET # BLD AUTO: 186 K/UL (ref 135–450)
PMV BLD AUTO: 8 FL (ref 5–10.5)
POTASSIUM SERPL-SCNC: 4.6 MMOL/L (ref 3.5–5.1)
RBC # BLD AUTO: 5.66 M/UL (ref 4.2–5.9)
SODIUM SERPL-SCNC: 133 MMOL/L (ref 136–145)
WBC # BLD AUTO: 7.9 K/UL (ref 4–11)

## 2024-12-11 PROCEDURE — 99238 HOSP IP/OBS DSCHRG MGMT 30/<: CPT | Performed by: INTERNAL MEDICINE

## 2024-12-11 PROCEDURE — 6370000000 HC RX 637 (ALT 250 FOR IP): Performed by: INTERNAL MEDICINE

## 2024-12-11 PROCEDURE — 6370000000 HC RX 637 (ALT 250 FOR IP)

## 2024-12-11 PROCEDURE — 36415 COLL VENOUS BLD VENIPUNCTURE: CPT

## 2024-12-11 PROCEDURE — 80048 BASIC METABOLIC PNL TOTAL CA: CPT

## 2024-12-11 PROCEDURE — 85025 COMPLETE CBC W/AUTO DIFF WBC: CPT

## 2024-12-11 RX ADMIN — ISOSORBIDE MONONITRATE 60 MG: 60 TABLET, EXTENDED RELEASE ORAL at 08:30

## 2024-12-11 RX ADMIN — BUSPIRONE HYDROCHLORIDE 5 MG: 5 TABLET ORAL at 08:30

## 2024-12-11 RX ADMIN — CARIPRAZINE 1.5 MG: 1.5 CAPSULE, GELATIN COATED ORAL at 08:28

## 2024-12-11 RX ADMIN — APIXABAN 5 MG: 5 TABLET, FILM COATED ORAL at 08:30

## 2024-12-11 RX ADMIN — ASPIRIN 81 MG: 81 TABLET, COATED ORAL at 08:30

## 2024-12-11 RX ADMIN — POTASSIUM CHLORIDE 20 MEQ: 1500 TABLET, EXTENDED RELEASE ORAL at 08:30

## 2024-12-11 RX ADMIN — EMPAGLIFLOZIN 10 MG: 10 TABLET, FILM COATED ORAL at 08:29

## 2024-12-11 RX ADMIN — ATORVASTATIN CALCIUM 40 MG: 40 TABLET, FILM COATED ORAL at 08:29

## 2024-12-11 RX ADMIN — SERTRALINE HYDROCHLORIDE 50 MG: 50 TABLET ORAL at 08:30

## 2024-12-11 RX ADMIN — LOSARTAN POTASSIUM 100 MG: 100 TABLET, FILM COATED ORAL at 08:29

## 2024-12-11 RX ADMIN — NIFEDIPINE 60 MG: 30 TABLET, FILM COATED, EXTENDED RELEASE ORAL at 08:30

## 2024-12-11 RX ADMIN — SPIRONOLACTONE 25 MG: 25 TABLET ORAL at 08:28

## 2024-12-11 RX ADMIN — CARVEDILOL 50 MG: 25 TABLET, FILM COATED ORAL at 08:30

## 2024-12-11 NOTE — CARE COORDINATION
DISCHARGE ORDER  Date/Time 2024 8:53 AM  Completed by: Kaylee Cortez, Case Management    Patient Name: Venkata Parker      : 1975  Admitting Diagnosis: Dizziness [R42]  Abnormal head CT [R93.0]  Vision changes [H53.9]  Stroke of unknown cause (HCC) [I63.9]  Acute nonintractable headache, unspecified headache type [R51.9]      Admit order Date and Status:24  (verify MD's last order for status of admission)      Noted discharge order.     Confirmed discharge plan : Yes  with whom patient and [facility]  If pt confirmed DC plan does family need to be contacted by CM No if yes who______  Discharge Plan: Pt to dc back to [facility].  [facility] to provide transport.     Date of Last IMM Given: na - Medicaid     Reviewed chart.  Role of discharge planner explained and patient verbalized understanding. Discharge order is noted.      Pt is being d/c'd to [facility] today. Pt's O2 sats are 97% on ra.    Discharge timeout done with CM/Pt/RN. All discharge needs and concerns addressed.

## 2024-12-11 NOTE — PROGRESS NOTES
Called and talked to Arleen at the [facility] she states that someone is on their way to pick pt up. Mihaela sent to Dr El to inform her of this.

## 2024-12-11 NOTE — FLOWSHEET NOTE
12/11/24 0827   Vital Signs   Temp 97 °F (36.1 °C)   Temp Source Axillary   Pulse 56   Heart Rate Source Monitor   Respirations 16   BP (!) 166/71   MAP (Calculated) 103   BP Location Left lower arm   BP Method Automatic   Patient Position Sitting;Up in chair   Pain Assessment   Pain Assessment None - Denies Pain   Oxygen Therapy   SpO2 96 %   O2 Device None (Room air)     AM assessment complete. Pt a&o x4. Cooperative with care. He is set to d/c back to [facility]. LCTA. Trace edema noted. Call light and bedside table within reach.

## 2024-12-11 NOTE — PROGRESS NOTES
Patient educated on discharge instructions as well as new medications use, dosage, administration and possible side effects.  Patient verified knowledge. IV removed without difficulty and dry dressing in place. Telemetry monitor removed and returned to [facility]. Pt left facility in stable condition to [facility] with all of their personal belongings.

## 2024-12-11 NOTE — PROGRESS NOTES
Progress Note    Admit Date:  12/8/2024    Subjective:  Mr. Parker found sitting in bed. Reports episode where his vision went black in both eyes yesterday, lasted about 5 seconds. Still having blurry vision of right eye, left eye is fine.  Chronic weakness in the bilateral knees.  No CP, SOB, nausea, abdominal pain.    Objective:   BP (!) 151/73   Pulse 59   Temp 97.7 °F (36.5 °C) (Oral)   Resp 16   Ht 1.829 m (6')   Wt (!) 137.1 kg (302 lb 3.2 oz)   SpO2 92%   BMI 40.99 kg/m²        Intake/Output Summary (Last 24 hours) at 12/11/2024 0820  Last data filed at 12/10/2024 1348  Gross per 24 hour   Intake 1080 ml   Output --   Net 1080 ml       Physical Exam:   Gen: No distress. Alert. Chronically ill-appearing. Obese.  Eyes: PERRL. No sclera icterus. No conjunctival injection.   ENT: No discharge. Pharynx clear.   Neck: No JVD.  No Carotid Bruit. Trachea midline.  Resp: No accessory muscle use. No crackles. No wheezes. No rhonchi.   CV: Regular rate. Regular rhythm. No murmur.  No rub. No edema.   Capillary Refill: Brisk,< 3 seconds   Peripheral Pulses: +2 palpable, equal bilaterally   GI: Non-tender. Non-distended. No masses. No organomegaly. Normal bowel sounds. No hernia.   Skin: Warm and dry. No nodule on exposed extremities. No rash on exposed extremities.   M/S: No cyanosis. No joint deformity. No clubbing.   Neuro: Awake. Grossly nonfocal    Psych: Oriented x 3. No anxiety or agitation.       Medications:  sodium chloride flush, 5-40 mL, 2 times per day  apixaban, 5 mg, BID  aspirin, 81 mg, Daily  atorvastatin, 40 mg, Daily  busPIRone, 5 mg, TID  cariprazine hcl, 1.5 mg, Daily  carvedilol, 50 mg, BID  empagliflozin, 10 mg, Daily  furosemide, 40 mg, BID  potassium chloride, 20 mEq, BID  NIFEdipine, 60 mg, BID  QUEtiapine, 100 mg, Nightly  sertraline, 50 mg, Daily  spironolactone, 25 mg, BID  losartan, 100 mg, Daily  isosorbide mononitrate, 60 mg, Daily  insulin lispro, 0-8 Units, 4x Daily AC &  neck.     CT HEAD WO CONTRAST Result Date: 12/8/2024  1. No acute intracranial finding. 2. Focal encephalomalacia in the right occipital lobe with compensatory enlargement of the right lateral ventricle. Suspected remote area of infarct. Correlate with neurologic history and consider follow-up MRI given acute on set of symptoms.       Assessment/Plan:  Blurry vision, dizziness, headache.   -NIH 0 in ED.  -CT head showing focal encephalomalacia in right occipital lobe with compensatory enlargement of right lateral ventricle, suspected remote area of infarct..   -CTA head/neck severe focal stenosis in mid P2 and left PCA, moderate stenosis in proximal M2 segment of right MCA.  -Neuro checks q4 hours.  -PT/OT.  -Telemetry monitoring.   -Neurology consulted.   -NPO pending swallow eval.  -Lipid panel ordered.   -MRI ordered - no stroke  -patient is on Lipitor and Plavix at home, not taking ASA. Previous notes stating to stop Plavix and continue ASA. Unsure why patient is on Plavix given no hx of stents.  -continue Lipitor, hold ASA and Plavix pending neurology input for medications.     Paroxysmal atrial fibrillation.  -continue home anticoagulation with Eliquis.  -continue home Coreg.     Type II diabetes mellitus.  -SSI.  -POC Glucose, carb control diet.     HFpEF.  -echo 10/8/2024 EF 55-60%, indeterminate diastolic function.   -.  -continue Jardiance, Aldactone, lasix.  -I/Os, daily weights.     Type A dissection s/p ascending aortic aneurysm repair (2022).  Chronic type B aortic dissection.  -strict BP control.  -outpatient follow up with cardiology for surveillance.     Methamphetamine abuse.  -currently residing at [facility] for [facility] tx.    Hypertension.  -continue home coreg, Imdur, losartan, nifedipine.     Hyperlipidemia.   -continue statin.     GERD.  -continue PPI.     Mood disorder.  -continue buspirone, Vraylar, Seroquel, Sertraline, trazodone.    Morbid Obesity  - Body mass index is 40.99

## 2024-12-11 NOTE — PROGRESS NOTES
PM assessment completed. Scheduled medications given per MAR. VSS room air, A/O x4. Patient stated he is refusing vitals through the night when he is sleeping, although stated he would call out when awake for us to do vitals. Patient is aware staff will be rounding although will attempt to not wake him. Patient does not appear in distress and denies any needs at this time. Call light in reach, will monitor,

## 2024-12-18 NOTE — PROGRESS NOTES
Physician Progress Note      PATIENT:               PAULINA SNOW  CSN #:                  [id number]  :                       1975  ADMIT DATE:       2024 9:34 PM  DISCH DATE:        2024 9:00 AM  RESPONDING  PROVIDER #:        Santo Coleman MD          QUERY TEXT:    Pt admitted with dizziness and visual changes. Pt noted to have remote   infarcts on imaging and cerebral artery stenosis. If possible, please document   in progress notes and discharge summary the relationship, if any, between   dizziness and visual changes to the remote infarct and the stenosis.    The medical record reflects the following:  Risk Factors: dizziness, visual changes, remote infarct and stenosis on   imaging    Clinical Indicators:  MRI brain- Old infarction in the right   occipitotemporal area with encephalomalacia.   CTA- Severe focal stenosis in the mid P2 segment of the left PCA.   Moderate stenosis in the proximal M2 segment of the right MCA.    Treatment: Labs, Imaging, neuro consult, Lipitor, Eliquis, ASA  Options provided:  -- Dizziness and visual changes due to late effects of prior CVA  -- Dizziness and visual changes due to stenosis of cerebral artery without   infarct  -- Dizziness and visual changes due to, Please specify  -- Other - I will add my own diagnosis  -- Disagree - Not applicable / Not valid  -- Disagree - Clinically unable to determine / Unknown  -- Refer to Clinical Documentation Reviewer    PROVIDER RESPONSE TEXT:    This patient has Dizziness and visual changes due to See addended note    Query created by: Grace Sanders on 2024 1:29 PM      Electronically signed by:  Santo Coleman MD 2024 9:08 AM

## 2024-12-19 ENCOUNTER — HOSPITAL ENCOUNTER (EMERGENCY)
Age: 49
Discharge: HOME | End: 2024-12-19
Attending: EMERGENCY MEDICINE
Payer: MEDICAID

## 2024-12-19 VITALS
SYSTOLIC BLOOD PRESSURE: 160 MMHG | HEART RATE: 63 BPM | WEIGHT: 306.6 LBS | HEIGHT: 72 IN | OXYGEN SATURATION: 96 % | BODY MASS INDEX: 41.53 KG/M2 | TEMPERATURE: 98.8 F | RESPIRATION RATE: 18 BRPM | DIASTOLIC BLOOD PRESSURE: 66 MMHG

## 2024-12-19 DIAGNOSIS — S61.211A LACERATION OF LEFT INDEX FINGER WITHOUT FOREIGN BODY WITHOUT DAMAGE TO NAIL, INITIAL ENCOUNTER: Primary | ICD-10-CM

## 2024-12-19 PROCEDURE — 90471 IMMUNIZATION ADMIN: CPT | Performed by: EMERGENCY MEDICINE

## 2024-12-19 PROCEDURE — 6360000002 HC RX W HCPCS: Performed by: EMERGENCY MEDICINE

## 2024-12-19 PROCEDURE — 12001 RPR S/N/AX/GEN/TRNK 2.5CM/<: CPT

## 2024-12-19 PROCEDURE — 99284 EMERGENCY DEPT VISIT MOD MDM: CPT

## 2024-12-19 PROCEDURE — 90715 TDAP VACCINE 7 YRS/> IM: CPT | Performed by: EMERGENCY MEDICINE

## 2024-12-19 RX ORDER — BACITRACIN ZINC AND POLYMYXIN B SULFATE 500; 1000 [USP'U]/G; [USP'U]/G
OINTMENT TOPICAL ONCE
Status: DISCONTINUED | OUTPATIENT
Start: 2024-12-19 | End: 2024-12-19 | Stop reason: HOSPADM

## 2024-12-19 RX ADMIN — TETANUS TOXOID, REDUCED DIPHTHERIA TOXOID AND ACELLULAR PERTUSSIS VACCINE, ADSORBED 0.5 ML: 5; 2.5; 8; 8; 2.5 SUSPENSION INTRAMUSCULAR at 21:09

## 2024-12-20 NOTE — ED PROVIDER NOTES
laceration was obtained by infiltration using 1% Lidocaine without epinephrine. The area was then cleansed with normal saline. The laceration was closed with 5-0 Prolene using interrupted sutures. There were no additional lacerations requiring repair. The wound area was then dressed with bacitracin and a sterile dressing.      Total repaired wound length: 1.5 cm.     Other Items: Suture count: 4    The patient tolerated the procedure well.    Complications: None      ED MEDS:  Patient was given the following medications:   Medications   tetanus-diphth-acell pertussis (BOOSTRIX) injection 0.5 mL (has no administration in time range)   bacitracin-polymyxin b (POLYSPORIN) ointment (has no administration in time range)     DISPOSITION/PLAN:   PATIENT REFERRED TO:   Your PCP    Schedule an appointment as soon as possible for a visit in 2 weeks  For suture removal     DISCHARGE MEDICATIONS:   New Prescriptions    No medications on file      DISCONTINUED MEDICATIONS:   Discontinued Medications    No medications on file      Electronically signed by: [unfilled], 12/19/2024 NOW@         Juventino Garza MD  12/19/24 1434    
No

## 2024-12-20 NOTE — PROGRESS NOTES
\"CHOLHDLRATIO\"  PT/INR: No results for input(s): \"PROTIME\", \"INR\" in the last 72 hours.  A1C: No results found for: \"LABA1C\"  BNP:  No results for input(s): \"BNP\" in the last 72 hours.    IMAGING:   MRI Brain 12/10/24  1. No acute intracranial abnormality.  2. Old infarction in the right occipitotemporal area with encephalomalacia.  3. No definable abnormality in the sella or suprasellar cistern or in the  area of the optic chiasm.  No definable abnormality in bilateral orbits on  this post-contrast study.    CTA Head Neck 12/8/24  Severe focal stenosis in the mid P2 segment of the left PCA.  Hypoplastic right PCA.  Moderate stenosis in the proximal M2 segment of the right MCA.  No large vessel occlusion in the neck.    EKG 12/8/24  Normal sinus rhythmLeft atrial enlargementRight superior axis deviationPoor R wave progressionProlonged QT     CT Abdomen Pelvis 10/12/24  1.   Dissection in the descending thoracic aorta..  2.   The celiac and SMA arteries and right renal artery originate from the true  channel. The left renal artery originates from the false channel..  3.   Dissection extends into the proximal left common iliac artery. The left  common iliac artery is dilated 19 mm consistent with unruptured aneurysm.  4.   Gallstones in the gallbladder.  5.   Moderate right pleural effusion..  6.   Consolidation in the right middle lobe may represent atelectasis or  pneumonia..    CT angiogram coronary 10/8/24  1.  Coronary calcification with an Agatston score = 491, which represents  99th percentile when matched for age, gender and ethnicity.  2.  CAD-RADS 1: Mild proximal RCA stenosis.  Consider optimal medical therapy.    Echo 10/8/24  Mildly dilated ascending aorta.  The ascending aorta is '4.3' cm in diameter, at the maximal visualized area.  Mild aortic root dilatation.  The aorta at the Sinuses of Valsalva is '3.8' cm in diameter.  Moderate aortic regurgitation.  The left ventricular end-diastolic dimension is

## 2024-12-27 ENCOUNTER — OFFICE VISIT (OUTPATIENT)
Dept: CARDIOLOGY CLINIC | Age: 49
End: 2024-12-27
Payer: MEDICAID

## 2024-12-27 VITALS
SYSTOLIC BLOOD PRESSURE: 120 MMHG | HEIGHT: 72 IN | BODY MASS INDEX: 41.72 KG/M2 | HEART RATE: 89 BPM | OXYGEN SATURATION: 96 % | WEIGHT: 308 LBS | DIASTOLIC BLOOD PRESSURE: 66 MMHG

## 2024-12-27 DIAGNOSIS — I25.10 CORONARY ARTERY CALCIFICATION OF NATIVE ARTERY: ICD-10-CM

## 2024-12-27 DIAGNOSIS — E78.2 MIXED HYPERLIPIDEMIA: ICD-10-CM

## 2024-12-27 DIAGNOSIS — Z86.79 H/O REPAIR OF DISSECTING ANEURYSM OF ASCENDING THORACIC AORTA: ICD-10-CM

## 2024-12-27 DIAGNOSIS — I25.84 CORONARY ARTERY CALCIFICATION OF NATIVE ARTERY: ICD-10-CM

## 2024-12-27 DIAGNOSIS — I10 PRIMARY HYPERTENSION: ICD-10-CM

## 2024-12-27 DIAGNOSIS — Z86.73 HISTORY OF STROKE: ICD-10-CM

## 2024-12-27 DIAGNOSIS — Z98.890 H/O REPAIR OF DISSECTING ANEURYSM OF ASCENDING THORACIC AORTA: ICD-10-CM

## 2024-12-27 DIAGNOSIS — I50.32 CHRONIC DIASTOLIC CONGESTIVE HEART FAILURE (HCC): ICD-10-CM

## 2024-12-27 DIAGNOSIS — I48.0 PAROXYSMAL ATRIAL FIBRILLATION (HCC): Primary | ICD-10-CM

## 2024-12-27 PROBLEM — I50.9 CONGESTIVE HEART FAILURE (CHF) (HCC): Status: ACTIVE | Noted: 2024-12-27

## 2024-12-27 PROCEDURE — 1036F TOBACCO NON-USER: CPT | Performed by: INTERNAL MEDICINE

## 2024-12-27 PROCEDURE — G8417 CALC BMI ABV UP PARAM F/U: HCPCS | Performed by: INTERNAL MEDICINE

## 2024-12-27 PROCEDURE — 99214 OFFICE O/P EST MOD 30 MIN: CPT | Performed by: INTERNAL MEDICINE

## 2024-12-27 PROCEDURE — 3074F SYST BP LT 130 MM HG: CPT | Performed by: INTERNAL MEDICINE

## 2024-12-27 PROCEDURE — 3078F DIAST BP <80 MM HG: CPT | Performed by: INTERNAL MEDICINE

## 2024-12-27 PROCEDURE — 1111F DSCHRG MED/CURRENT MED MERGE: CPT | Performed by: INTERNAL MEDICINE

## 2024-12-27 PROCEDURE — G8484 FLU IMMUNIZE NO ADMIN: HCPCS | Performed by: INTERNAL MEDICINE

## 2024-12-27 PROCEDURE — G8427 DOCREV CUR MEDS BY ELIG CLIN: HCPCS | Performed by: INTERNAL MEDICINE

## 2024-12-27 RX ORDER — POTASSIUM CHLORIDE 1500 MG/1
TABLET, EXTENDED RELEASE ORAL
COMMUNITY
Start: 2024-09-26 | End: 2024-12-27

## 2024-12-27 RX ORDER — ACYCLOVIR 400 MG/1
TABLET ORAL
COMMUNITY
Start: 2024-11-01

## 2024-12-27 RX ORDER — CLOPIDOGREL BISULFATE 75 MG/1
TABLET ORAL
COMMUNITY
Start: 2024-12-23

## 2024-12-27 RX ORDER — BUPROPION HYDROCHLORIDE 150 MG/1
TABLET ORAL
COMMUNITY
Start: 2024-12-20

## 2024-12-27 NOTE — PATIENT INSTRUCTIONS
Plan:  Labs reviewed in epic and discussed with patient.   Current medications reviewed.  No medication changes. Refills given as warranted.  It is ok if you do not take aspirin 81 mg anymore.   Continue taking clopidogrel.   Stop taking isosorbide.   Stop taking potassium  You can call [address] [facility] for free resources to help you quit smoking.    Recommend a repeat echo in October 2025.  It is ok for you to exercise but do not lift any weight over your head or do any pushups.      Follow up with me in 6 months.

## 2025-01-23 ENCOUNTER — HOSPITAL ENCOUNTER (INPATIENT)
Age: 50
LOS: 5 days | Discharge: NURSING, REHAB OR LONG-TERM CARE | DRG: 199 | End: 2025-01-28
Attending: INTERNAL MEDICINE | Admitting: STUDENT IN AN ORGANIZED HEALTH CARE EDUCATION/TRAINING PROGRAM
Payer: MEDICAID

## 2025-01-23 ENCOUNTER — APPOINTMENT (OUTPATIENT)
Dept: GENERAL RADIOLOGY | Age: 50
End: 2025-01-23
Payer: MEDICAID

## 2025-01-23 ENCOUNTER — APPOINTMENT (OUTPATIENT)
Dept: CT IMAGING | Age: 50
End: 2025-01-23
Payer: MEDICAID

## 2025-01-23 ENCOUNTER — HOSPITAL ENCOUNTER (EMERGENCY)
Age: 50
Discharge: ANOTHER HOSPITAL | End: 2025-01-23
Attending: EMERGENCY MEDICINE
Payer: MEDICAID

## 2025-01-23 VITALS
RESPIRATION RATE: 18 BRPM | OXYGEN SATURATION: 96 % | SYSTOLIC BLOOD PRESSURE: 206 MMHG | TEMPERATURE: 98.6 F | DIASTOLIC BLOOD PRESSURE: 87 MMHG | HEART RATE: 69 BPM

## 2025-01-23 DIAGNOSIS — R07.9 CHEST PAIN, UNSPECIFIED TYPE: ICD-10-CM

## 2025-01-23 DIAGNOSIS — I10 ESSENTIAL HYPERTENSION: ICD-10-CM

## 2025-01-23 DIAGNOSIS — I71.03 DISSECTION OF THORACOABDOMINAL AORTA (HCC): Primary | ICD-10-CM

## 2025-01-23 PROBLEM — I16.0 HYPERTENSIVE URGENCY: Status: ACTIVE | Noted: 2025-01-23

## 2025-01-23 LAB
ALBUMIN SERPL-MCNC: 3.6 G/DL (ref 3.4–5)
ALBUMIN/GLOB SERPL: 1.2 {RATIO} (ref 1.1–2.2)
ALP SERPL-CCNC: 54 U/L (ref 40–129)
ALT SERPL-CCNC: 16 U/L (ref 10–40)
AMPHETAMINES UR QL SCN>1000 NG/ML: NORMAL
ANION GAP SERPL CALCULATED.3IONS-SCNC: 10 MMOL/L (ref 3–16)
AST SERPL-CCNC: 15 U/L (ref 15–37)
BARBITURATES UR QL SCN>200 NG/ML: NORMAL
BASOPHILS # BLD: 0.1 K/UL (ref 0–0.2)
BASOPHILS NFR BLD: 1.1 %
BENZODIAZ UR QL SCN>200 NG/ML: NORMAL
BILIRUB SERPL-MCNC: 0.3 MG/DL (ref 0–1)
BUN SERPL-MCNC: 21 MG/DL (ref 7–20)
CALCIUM SERPL-MCNC: 7.9 MG/DL (ref 8.3–10.6)
CANNABINOIDS UR QL SCN>50 NG/ML: NORMAL
CHLORIDE SERPL-SCNC: 102 MMOL/L (ref 99–110)
CO2 SERPL-SCNC: 28 MMOL/L (ref 21–32)
COCAINE UR QL SCN: NORMAL
CREAT SERPL-MCNC: 0.8 MG/DL (ref 0.9–1.3)
DEPRECATED RDW RBC AUTO: 15.9 % (ref 12.4–15.4)
DRUG SCREEN COMMENT UR-IMP: NORMAL
EKG ATRIAL RATE: 61 BPM
EKG DIAGNOSIS: NORMAL
EKG P AXIS: 48 DEGREES
EKG P-R INTERVAL: 192 MS
EKG Q-T INTERVAL: 476 MS
EKG QRS DURATION: 118 MS
EKG QTC CALCULATION (BAZETT): 479 MS
EKG R AXIS: 110 DEGREES
EKG T AXIS: 91 DEGREES
EKG VENTRICULAR RATE: 61 BPM
EOSINOPHIL # BLD: 0.2 K/UL (ref 0–0.6)
EOSINOPHIL NFR BLD: 2.6 %
FENTANYL SCREEN, URINE: NORMAL
FLUAV RNA RESP QL NAA+PROBE: NOT DETECTED
FLUBV RNA RESP QL NAA+PROBE: NOT DETECTED
GFR SERPLBLD CREATININE-BSD FMLA CKD-EPI: >90 ML/MIN/{1.73_M2}
GLUCOSE SERPL-MCNC: 140 MG/DL (ref 70–99)
HCT VFR BLD AUTO: 40.5 % (ref 40.5–52.5)
HGB BLD-MCNC: 13.3 G/DL (ref 13.5–17.5)
LYMPHOCYTES # BLD: 1.2 K/UL (ref 1–5.1)
LYMPHOCYTES NFR BLD: 13.8 %
MAGNESIUM SERPL-MCNC: 2.18 MG/DL (ref 1.8–2.4)
MCH RBC QN AUTO: 26.4 PG (ref 26–34)
MCHC RBC AUTO-ENTMCNC: 32.8 G/DL (ref 31–36)
MCV RBC AUTO: 80.3 FL (ref 80–100)
METHADONE UR QL SCN>300 NG/ML: NORMAL
MONOCYTES # BLD: 0.8 K/UL (ref 0–1.3)
MONOCYTES NFR BLD: 9.8 %
NEUTROPHILS # BLD: 6.2 K/UL (ref 1.7–7.7)
NEUTROPHILS NFR BLD: 72.7 %
OPIATES UR QL SCN>300 NG/ML: NORMAL
OXYCODONE UR QL SCN: NORMAL
PCP UR QL SCN>25 NG/ML: NORMAL
PH UR STRIP: 5 [PH]
PLATELET # BLD AUTO: 153 K/UL (ref 135–450)
PMV BLD AUTO: 7.6 FL (ref 5–10.5)
POTASSIUM SERPL-SCNC: 4.1 MMOL/L (ref 3.5–5.1)
PROT SERPL-MCNC: 6.6 G/DL (ref 6.4–8.2)
RBC # BLD AUTO: 5.04 M/UL (ref 4.2–5.9)
SARS-COV-2 RNA RESP QL NAA+PROBE: NOT DETECTED
SODIUM SERPL-SCNC: 140 MMOL/L (ref 136–145)
TROPONIN, HIGH SENSITIVITY: 15 NG/L (ref 0–22)
TROPONIN, HIGH SENSITIVITY: 16 NG/L (ref 0–22)
WBC # BLD AUTO: 8.5 K/UL (ref 4–11)

## 2025-01-23 PROCEDURE — 93010 ELECTROCARDIOGRAM REPORT: CPT | Performed by: INTERNAL MEDICINE

## 2025-01-23 PROCEDURE — 84484 ASSAY OF TROPONIN QUANT: CPT

## 2025-01-23 PROCEDURE — 96376 TX/PRO/DX INJ SAME DRUG ADON: CPT

## 2025-01-23 PROCEDURE — 2580000003 HC RX 258: Performed by: STUDENT IN AN ORGANIZED HEALTH CARE EDUCATION/TRAINING PROGRAM

## 2025-01-23 PROCEDURE — 2580000003 HC RX 258: Performed by: EMERGENCY MEDICINE

## 2025-01-23 PROCEDURE — 6370000000 HC RX 637 (ALT 250 FOR IP): Performed by: STUDENT IN AN ORGANIZED HEALTH CARE EDUCATION/TRAINING PROGRAM

## 2025-01-23 PROCEDURE — 6360000002 HC RX W HCPCS: Performed by: EMERGENCY MEDICINE

## 2025-01-23 PROCEDURE — 93005 ELECTROCARDIOGRAM TRACING: CPT | Performed by: EMERGENCY MEDICINE

## 2025-01-23 PROCEDURE — 6360000004 HC RX CONTRAST MEDICATION: Performed by: EMERGENCY MEDICINE

## 2025-01-23 PROCEDURE — 96365 THER/PROPH/DIAG IV INF INIT: CPT

## 2025-01-23 PROCEDURE — 96375 TX/PRO/DX INJ NEW DRUG ADDON: CPT

## 2025-01-23 PROCEDURE — 80053 COMPREHEN METABOLIC PANEL: CPT

## 2025-01-23 PROCEDURE — 2000000000 HC ICU R&B

## 2025-01-23 PROCEDURE — 96366 THER/PROPH/DIAG IV INF ADDON: CPT

## 2025-01-23 PROCEDURE — 80307 DRUG TEST PRSMV CHEM ANLYZR: CPT

## 2025-01-23 PROCEDURE — 75635 CT ANGIO ABDOMINAL ARTERIES: CPT

## 2025-01-23 PROCEDURE — 6360000002 HC RX W HCPCS: Performed by: STUDENT IN AN ORGANIZED HEALTH CARE EDUCATION/TRAINING PROGRAM

## 2025-01-23 PROCEDURE — 83735 ASSAY OF MAGNESIUM: CPT

## 2025-01-23 PROCEDURE — 71275 CT ANGIOGRAPHY CHEST: CPT

## 2025-01-23 PROCEDURE — 2500000003 HC RX 250 WO HCPCS: Performed by: STUDENT IN AN ORGANIZED HEALTH CARE EDUCATION/TRAINING PROGRAM

## 2025-01-23 PROCEDURE — 71046 X-RAY EXAM CHEST 2 VIEWS: CPT

## 2025-01-23 PROCEDURE — 87636 SARSCOV2 & INF A&B AMP PRB: CPT

## 2025-01-23 PROCEDURE — 36415 COLL VENOUS BLD VENIPUNCTURE: CPT

## 2025-01-23 PROCEDURE — 85025 COMPLETE CBC W/AUTO DIFF WBC: CPT

## 2025-01-23 PROCEDURE — 99285 EMERGENCY DEPT VISIT HI MDM: CPT

## 2025-01-23 RX ORDER — SODIUM CHLORIDE 0.9 % (FLUSH) 0.9 %
5-40 SYRINGE (ML) INJECTION PRN
Status: DISCONTINUED | OUTPATIENT
Start: 2025-01-23 | End: 2025-01-28 | Stop reason: HOSPADM

## 2025-01-23 RX ORDER — ONDANSETRON 4 MG/1
4 TABLET, ORALLY DISINTEGRATING ORAL EVERY 8 HOURS PRN
Status: DISCONTINUED | OUTPATIENT
Start: 2025-01-23 | End: 2025-01-28 | Stop reason: HOSPADM

## 2025-01-23 RX ORDER — ACETAMINOPHEN 325 MG/1
650 TABLET ORAL EVERY 6 HOURS PRN
Status: DISCONTINUED | OUTPATIENT
Start: 2025-01-23 | End: 2025-01-28 | Stop reason: HOSPADM

## 2025-01-23 RX ORDER — POTASSIUM CHLORIDE 29.8 MG/ML
20 INJECTION INTRAVENOUS PRN
Status: DISCONTINUED | OUTPATIENT
Start: 2025-01-23 | End: 2025-01-28 | Stop reason: HOSPADM

## 2025-01-23 RX ORDER — BUSPIRONE HYDROCHLORIDE 5 MG/1
5 TABLET ORAL 3 TIMES DAILY
Status: DISCONTINUED | OUTPATIENT
Start: 2025-01-23 | End: 2025-01-28 | Stop reason: HOSPADM

## 2025-01-23 RX ORDER — DEXTROSE MONOHYDRATE 100 MG/ML
INJECTION, SOLUTION INTRAVENOUS CONTINUOUS PRN
Status: DISCONTINUED | OUTPATIENT
Start: 2025-01-23 | End: 2025-01-28 | Stop reason: HOSPADM

## 2025-01-23 RX ORDER — FUROSEMIDE 40 MG/1
40 TABLET ORAL 2 TIMES DAILY
Status: DISCONTINUED | OUTPATIENT
Start: 2025-01-24 | End: 2025-01-24

## 2025-01-23 RX ORDER — MAGNESIUM SULFATE IN WATER 40 MG/ML
2000 INJECTION, SOLUTION INTRAVENOUS PRN
Status: DISCONTINUED | OUTPATIENT
Start: 2025-01-23 | End: 2025-01-28 | Stop reason: HOSPADM

## 2025-01-23 RX ORDER — SPIRONOLACTONE 25 MG/1
25 TABLET ORAL 2 TIMES DAILY
Status: DISCONTINUED | OUTPATIENT
Start: 2025-01-23 | End: 2025-01-28 | Stop reason: HOSPADM

## 2025-01-23 RX ORDER — NIFEDIPINE 30 MG/1
60 TABLET, EXTENDED RELEASE ORAL 2 TIMES DAILY
Status: DISCONTINUED | OUTPATIENT
Start: 2025-01-23 | End: 2025-01-28 | Stop reason: HOSPADM

## 2025-01-23 RX ORDER — INSULIN LISPRO 100 [IU]/ML
0-4 INJECTION, SOLUTION INTRAVENOUS; SUBCUTANEOUS
Status: DISCONTINUED | OUTPATIENT
Start: 2025-01-23 | End: 2025-01-28 | Stop reason: HOSPADM

## 2025-01-23 RX ORDER — IOPAMIDOL 755 MG/ML
100 INJECTION, SOLUTION INTRAVASCULAR
Status: DISCONTINUED | OUTPATIENT
Start: 2025-01-23 | End: 2025-01-23 | Stop reason: HOSPADM

## 2025-01-23 RX ORDER — IOPAMIDOL 755 MG/ML
100 INJECTION, SOLUTION INTRAVASCULAR
Status: COMPLETED | OUTPATIENT
Start: 2025-01-23 | End: 2025-01-23

## 2025-01-23 RX ORDER — HYDRALAZINE HYDROCHLORIDE 20 MG/ML
10 INJECTION INTRAMUSCULAR; INTRAVENOUS ONCE
Status: COMPLETED | OUTPATIENT
Start: 2025-01-23 | End: 2025-01-23

## 2025-01-23 RX ORDER — ACETAMINOPHEN 650 MG/1
650 SUPPOSITORY RECTAL EVERY 6 HOURS PRN
Status: DISCONTINUED | OUTPATIENT
Start: 2025-01-23 | End: 2025-01-28 | Stop reason: HOSPADM

## 2025-01-23 RX ORDER — QUETIAPINE FUMARATE 100 MG/1
100 TABLET, FILM COATED ORAL NIGHTLY
Status: DISCONTINUED | OUTPATIENT
Start: 2025-01-23 | End: 2025-01-28 | Stop reason: HOSPADM

## 2025-01-23 RX ORDER — POLYETHYLENE GLYCOL 3350 17 G/17G
17 POWDER, FOR SOLUTION ORAL DAILY PRN
Status: DISCONTINUED | OUTPATIENT
Start: 2025-01-23 | End: 2025-01-28 | Stop reason: HOSPADM

## 2025-01-23 RX ORDER — LOSARTAN POTASSIUM 100 MG/1
100 TABLET ORAL DAILY
Status: DISCONTINUED | OUTPATIENT
Start: 2025-01-24 | End: 2025-01-28 | Stop reason: HOSPADM

## 2025-01-23 RX ORDER — ATORVASTATIN CALCIUM 40 MG/1
40 TABLET, FILM COATED ORAL DAILY
Status: DISCONTINUED | OUTPATIENT
Start: 2025-01-23 | End: 2025-01-28 | Stop reason: HOSPADM

## 2025-01-23 RX ORDER — SODIUM CHLORIDE 9 MG/ML
INJECTION, SOLUTION INTRAVENOUS PRN
Status: DISCONTINUED | OUTPATIENT
Start: 2025-01-23 | End: 2025-01-28 | Stop reason: HOSPADM

## 2025-01-23 RX ORDER — SODIUM CHLORIDE 0.9 % (FLUSH) 0.9 %
5-40 SYRINGE (ML) INJECTION EVERY 12 HOURS SCHEDULED
Status: DISCONTINUED | OUTPATIENT
Start: 2025-01-23 | End: 2025-01-28 | Stop reason: HOSPADM

## 2025-01-23 RX ORDER — POTASSIUM CHLORIDE 7.45 MG/ML
10 INJECTION INTRAVENOUS PRN
Status: DISCONTINUED | OUTPATIENT
Start: 2025-01-23 | End: 2025-01-28 | Stop reason: HOSPADM

## 2025-01-23 RX ORDER — CARVEDILOL 25 MG/1
50 TABLET ORAL 2 TIMES DAILY WITH MEALS
Status: DISCONTINUED | OUTPATIENT
Start: 2025-01-24 | End: 2025-01-28 | Stop reason: HOSPADM

## 2025-01-23 RX ORDER — ONDANSETRON 2 MG/ML
4 INJECTION INTRAMUSCULAR; INTRAVENOUS EVERY 6 HOURS PRN
Status: DISCONTINUED | OUTPATIENT
Start: 2025-01-23 | End: 2025-01-28 | Stop reason: HOSPADM

## 2025-01-23 RX ORDER — GLUCAGON 1 MG/ML
1 KIT INJECTION PRN
Status: DISCONTINUED | OUTPATIENT
Start: 2025-01-23 | End: 2025-01-28 | Stop reason: HOSPADM

## 2025-01-23 RX ORDER — TRAZODONE HYDROCHLORIDE 50 MG/1
50 TABLET, FILM COATED ORAL NIGHTLY
Status: DISCONTINUED | OUTPATIENT
Start: 2025-01-23 | End: 2025-01-28 | Stop reason: HOSPADM

## 2025-01-23 RX ORDER — ENOXAPARIN SODIUM 100 MG/ML
30 INJECTION SUBCUTANEOUS 2 TIMES DAILY
Status: DISCONTINUED | OUTPATIENT
Start: 2025-01-23 | End: 2025-01-23

## 2025-01-23 RX ORDER — CLOPIDOGREL BISULFATE 75 MG/1
75 TABLET ORAL DAILY
Status: DISCONTINUED | OUTPATIENT
Start: 2025-01-24 | End: 2025-01-28 | Stop reason: HOSPADM

## 2025-01-23 RX ORDER — BUPROPION HYDROCHLORIDE 150 MG/1
150 TABLET ORAL DAILY
Status: DISCONTINUED | OUTPATIENT
Start: 2025-01-24 | End: 2025-01-28 | Stop reason: HOSPADM

## 2025-01-23 RX ADMIN — TRAZODONE HYDROCHLORIDE 50 MG: 50 TABLET ORAL at 21:42

## 2025-01-23 RX ADMIN — HYDRALAZINE HYDROCHLORIDE 10 MG: 20 INJECTION INTRAMUSCULAR; INTRAVENOUS at 15:26

## 2025-01-23 RX ADMIN — SODIUM CHLORIDE 10 MG/HR: 9 INJECTION, SOLUTION INTRAVENOUS at 21:34

## 2025-01-23 RX ADMIN — APIXABAN 5 MG: 5 TABLET, FILM COATED ORAL at 21:42

## 2025-01-23 RX ADMIN — SODIUM CHLORIDE 5 MG/HR: 9 INJECTION, SOLUTION INTRAVENOUS at 17:51

## 2025-01-23 RX ADMIN — SPIRONOLACTONE 25 MG: 25 TABLET ORAL at 21:42

## 2025-01-23 RX ADMIN — HYDRALAZINE HYDROCHLORIDE 10 MG: 20 INJECTION INTRAMUSCULAR; INTRAVENOUS at 16:30

## 2025-01-23 RX ADMIN — NIFEDIPINE 60 MG: 30 TABLET, FILM COATED, EXTENDED RELEASE ORAL at 21:42

## 2025-01-23 RX ADMIN — BUSPIRONE HYDROCHLORIDE 5 MG: 5 TABLET ORAL at 21:42

## 2025-01-23 RX ADMIN — ATORVASTATIN CALCIUM 40 MG: 40 TABLET, FILM COATED ORAL at 21:42

## 2025-01-23 RX ADMIN — QUETIAPINE FUMARATE 100 MG: 100 TABLET ORAL at 21:42

## 2025-01-23 RX ADMIN — IOPAMIDOL 100 ML: 755 INJECTION, SOLUTION INTRAVENOUS at 14:21

## 2025-01-23 RX ADMIN — SODIUM CHLORIDE, PRESERVATIVE FREE 10 ML: 5 INJECTION INTRAVENOUS at 21:42

## 2025-01-23 ASSESSMENT — PAIN - FUNCTIONAL ASSESSMENT: PAIN_FUNCTIONAL_ASSESSMENT: NONE - DENIES PAIN

## 2025-01-23 NOTE — ED NOTES
17[address] [facility] called back with the Hospitalists at [place] on the line speaking with Dr. Alexander

## 2025-01-23 NOTE — ED PROVIDER NOTES
[facility] EMERGENCY DEPARTMENT      CHIEF COMPLAINT  Chest Pain (Pt arrives via EMS with CP that has peng going on x3 days. Aortic dissection as of '21. )       HISTORY OF PRESENT ILLNESS  Venkata Parker is a 49 y.o. male  who presents to the ED complaining of concern for chest pain.  This has been ongoing for the past 3 days and is intermittent sharp pain.  Nothing seems to make it better or worse.  He states that it feels similar to when he had an aortic dissection in 2021 while he was in [place].  Patient denies any history of coronary artery disease.  He denies any abdominal pain.  No vomiting.  He has not taken anything for the pain.  The pain lasts for short time when it does occur.    Outside records reviewed from [facility] with discharge summary dated 5/1/2023 reviewed revealing that patient was admitted with diagnosis of aortic dissection at that time.  It was revealed that patient had a history of type a dissection status post ascending aortic repair in April 2022, history of methamphetamine use, and had presented to outside facility during this episode on 4/21/2023 with hypertension and acute type B aortic dissection without evidence of rupture.  He was managed on esmolol drip and medical management of his blood pressure without surgical intervention was noted during the stay.  He was noted to have an aortic arch intramural hematoma during that visit as well.    No other complaints, modifying factors or associated symptoms.     I have reviewed the following from the nursing documentation.    Past Medical History:   Diagnosis Date    Aortic dissection, thoracic (HCC) 2021    Atrial fibrillation (HCC)     CHF (congestive heart failure) (HCC)     Diabetes mellitus (HCC)     Hypertension      History reviewed. No pertinent surgical history.  History reviewed. No pertinent family history.  Social History     Socioeconomic History    Marital status: Single     Spouse

## 2025-01-23 NOTE — PROGRESS NOTES
When original order was placed for CTA Chest to r/o dissection, I called Dr. Ochoa to review the order. I informed her that the protocol for CRC for dissection states we should include the abd/pelvis. Dr. Ochoa insisted the patient's history stated thoracic dissection and she saw no need for Abd/pelv scan. After CRC reviewed the images, they informed Dr. Ochoa that further evaluation of aorta through the abd/pelv and iliac arteries was necessary. A new scan including abd/pelv run off CTA was ordered and another 100ml of contrast was used. I informed our lead technologist of the incident.

## 2025-01-24 PROCEDURE — 97162 PT EVAL MOD COMPLEX 30 MIN: CPT

## 2025-01-24 PROCEDURE — 2580000003 HC RX 258: Performed by: STUDENT IN AN ORGANIZED HEALTH CARE EDUCATION/TRAINING PROGRAM

## 2025-01-24 PROCEDURE — 6370000000 HC RX 637 (ALT 250 FOR IP): Performed by: STUDENT IN AN ORGANIZED HEALTH CARE EDUCATION/TRAINING PROGRAM

## 2025-01-24 PROCEDURE — 99291 CRITICAL CARE FIRST HOUR: CPT | Performed by: INTERNAL MEDICINE

## 2025-01-24 PROCEDURE — 6360000002 HC RX W HCPCS: Performed by: STUDENT IN AN ORGANIZED HEALTH CARE EDUCATION/TRAINING PROGRAM

## 2025-01-24 PROCEDURE — 2500000003 HC RX 250 WO HCPCS: Performed by: STUDENT IN AN ORGANIZED HEALTH CARE EDUCATION/TRAINING PROGRAM

## 2025-01-24 PROCEDURE — 97165 OT EVAL LOW COMPLEX 30 MIN: CPT

## 2025-01-24 PROCEDURE — 6360000002 HC RX W HCPCS: Performed by: CLINICAL NURSE SPECIALIST

## 2025-01-24 PROCEDURE — 2000000000 HC ICU R&B

## 2025-01-24 PROCEDURE — 99254 IP/OBS CNSLTJ NEW/EST MOD 60: CPT | Performed by: SURGERY

## 2025-01-24 PROCEDURE — 2580000003 HC RX 258: Performed by: CLINICAL NURSE SPECIALIST

## 2025-01-24 PROCEDURE — 97530 THERAPEUTIC ACTIVITIES: CPT

## 2025-01-24 RX ORDER — IPRATROPIUM BROMIDE AND ALBUTEROL SULFATE 2.5; .5 MG/3ML; MG/3ML
1 SOLUTION RESPIRATORY (INHALATION) EVERY 4 HOURS PRN
Status: DISCONTINUED | OUTPATIENT
Start: 2025-01-24 | End: 2025-01-28 | Stop reason: HOSPADM

## 2025-01-24 RX ORDER — MUPIROCIN 20 MG/G
OINTMENT TOPICAL 2 TIMES DAILY
Status: DISCONTINUED | OUTPATIENT
Start: 2025-01-24 | End: 2025-01-28 | Stop reason: HOSPADM

## 2025-01-24 RX ORDER — FUROSEMIDE 40 MG/1
40 TABLET ORAL 2 TIMES DAILY
Status: DISCONTINUED | OUTPATIENT
Start: 2025-01-24 | End: 2025-01-28 | Stop reason: HOSPADM

## 2025-01-24 RX ADMIN — BUSPIRONE HYDROCHLORIDE 5 MG: 5 TABLET ORAL at 13:52

## 2025-01-24 RX ADMIN — BUSPIRONE HYDROCHLORIDE 5 MG: 5 TABLET ORAL at 19:55

## 2025-01-24 RX ADMIN — CARVEDILOL 50 MG: 25 TABLET, FILM COATED ORAL at 08:24

## 2025-01-24 RX ADMIN — SODIUM CHLORIDE, PRESERVATIVE FREE 10 ML: 5 INJECTION INTRAVENOUS at 19:48

## 2025-01-24 RX ADMIN — SODIUM CHLORIDE 12 MG/HR: 9 INJECTION, SOLUTION INTRAVENOUS at 20:01

## 2025-01-24 RX ADMIN — ATORVASTATIN CALCIUM 40 MG: 40 TABLET, FILM COATED ORAL at 08:25

## 2025-01-24 RX ADMIN — FUROSEMIDE 40 MG: 40 TABLET ORAL at 16:49

## 2025-01-24 RX ADMIN — SODIUM CHLORIDE 12 MG/HR: 9 INJECTION, SOLUTION INTRAVENOUS at 22:02

## 2025-01-24 RX ADMIN — SODIUM CHLORIDE 12 MG/HR: 9 INJECTION, SOLUTION INTRAVENOUS at 16:53

## 2025-01-24 RX ADMIN — BUPROPION HYDROCHLORIDE 150 MG: 150 TABLET, EXTENDED RELEASE ORAL at 08:25

## 2025-01-24 RX ADMIN — FUROSEMIDE 40 MG: 40 TABLET ORAL at 08:29

## 2025-01-24 RX ADMIN — NIFEDIPINE 60 MG: 30 TABLET, FILM COATED, EXTENDED RELEASE ORAL at 08:25

## 2025-01-24 RX ADMIN — SODIUM CHLORIDE 9 MG/HR: 9 INJECTION, SOLUTION INTRAVENOUS at 14:03

## 2025-01-24 RX ADMIN — SPIRONOLACTONE 25 MG: 25 TABLET ORAL at 08:25

## 2025-01-24 RX ADMIN — CARVEDILOL 50 MG: 25 TABLET, FILM COATED ORAL at 16:49

## 2025-01-24 RX ADMIN — QUETIAPINE FUMARATE 100 MG: 100 TABLET ORAL at 19:55

## 2025-01-24 RX ADMIN — TRAZODONE HYDROCHLORIDE 50 MG: 50 TABLET ORAL at 19:55

## 2025-01-24 RX ADMIN — CLOPIDOGREL BISULFATE 75 MG: 75 TABLET ORAL at 08:25

## 2025-01-24 RX ADMIN — SODIUM CHLORIDE 2.5 MG/HR: 9 INJECTION, SOLUTION INTRAVENOUS at 09:15

## 2025-01-24 RX ADMIN — BUSPIRONE HYDROCHLORIDE 5 MG: 5 TABLET ORAL at 08:25

## 2025-01-24 RX ADMIN — LOSARTAN POTASSIUM 100 MG: 100 TABLET, FILM COATED ORAL at 08:25

## 2025-01-24 RX ADMIN — NIFEDIPINE 60 MG: 30 TABLET, FILM COATED, EXTENDED RELEASE ORAL at 19:55

## 2025-01-24 RX ADMIN — APIXABAN 5 MG: 5 TABLET, FILM COATED ORAL at 19:55

## 2025-01-24 RX ADMIN — APIXABAN 5 MG: 5 TABLET, FILM COATED ORAL at 08:25

## 2025-01-24 ASSESSMENT — PAIN SCALES - GENERAL: PAINLEVEL_OUTOF10: 0

## 2025-01-24 NOTE — CONSULTS
PULMONARY AND CRITICAL CARE INPATIENT NOTE        Venkata Parker   : 1975  MRN: [id number]     Admitting Physician: Hugh Torres MD  Attending Physician: Hugh Torres MD  PCP: Jakob Jauregui APRN - CNP    Admission: 2025   Date of Service: 2025    No chief complaint on file.          ASSESSMENT & PLAN       49 y.o. pleasant  male patient with:    Assessment:  Hypertensive crisis.  Possible medication noncompliance  Chronic type B aortic aneurysm, type aortic aneurysm repair in .  History of meth use.  Negative on presentation.  In rehab  Metabolic syndrome: HTN from HLD, DM2, morbid obesity class III  HFpEF  A-fib on anticoagulation  Likely undiagnosed sleep apnea with loud snoring and nocturnal hypoxia..  Tobacco dependence more than 30 pack years.  No interest in quitting  COPD.  Clinical diagnosis.  No PFTs.  On albuterol as needed  Mood disorder on multiple psych meds      Plan:              Continue to wean off nicardipine drip.  Blood pressure target less than 140  Oral hypertensive medications resumed  Patient has no interest in smoking cessation  Patient has no interest in sleep apnea evaluation despite explaining the risks and benefits as well as treatment modalities other than CPAP.  Vascular surgery evaluated and no plan for intervention other than blood pressure control.  DuoNebs as needed    Target Hb >7, Platelets>50 unless specified otherwise.  Keep blood sugar between 140 and 180 mg/dL  Bowel regimen: Notify provider if no bowel movement for 48 hrs.  Bedsore prevention measures.   DVT prophylaxis measures.  PT/OT when patient is able to participate.  Alert provider about unnecessary catheters (central lines, Rivas catheter) or tubes or with early signs of inflammation to discontinue.      LOS: Hospital Day: 2     Code:Full Code      Critical care time spent on this patient, excluding procedures time, is ~35 minutes.  Critical care time was spent on reviewing overnight

## 2025-01-24 NOTE — PROGRESS NOTES
Physical Therapy  Facility/Department: [facility] C2 CARD TELEMETRY  Physical Therapy Initial Assessment and Discharge    Name: Venkata Parker  : 1975  MRN: [id number]  Date of Service: 2025    Discharge Recommendations:  Home with assist PRN   PT Equipment Recommendations  Equipment Needed: No      Past Medical History:  has a past medical history of Aortic dissection, thoracic (HCC), Atrial fibrillation (HCC), CHF (congestive heart failure) (HCC), Diabetes mellitus (HCC), Hyperlipidemia, and Hypertension.  Past Surgical History:  has no past surgical history on file.    Assessment  Assessment: Pt is a 50 y/o male who presents with hypertensive urgency. Pt was independent prior to admit and in drug rehab. Pt currently demonstrates independence with tranfers and balance. No further skilled PT indicated at this time. Pt safe to return home when medically stable.  Therapy Prognosis: Good  Decision Making: Medium Complexity  No Skilled PT: Independent with functional mobility   Requires PT Follow-Up: No  Activity Tolerance  Activity Tolerance: Patient tolerated evaluation without incident    Plan  Physical Therapy Plan  General Plan: Discharge with evaluation only  Safety Devices  Type of Devices: All fall risk precautions in place, Call light within reach, Left in chair, Nurse notified    Restrictions  Restrictions/Precautions  Restrictions/Precautions: Fall Risk  Position Activity Restriction  Other Position/Activity Restrictions: ICU monitoring     Subjective  General  Chart Reviewed: Yes  Patient assessed for rehabilitation services?: Yes  Family/Caregiver Present: No  Referring Practitioner: Hugh Torres MD  Referral Date : 25  Diagnosis: hypertensive urgency  Follows Commands: Within Functional Limits  Subjective  Subjective: Pt irritable and agreeable to minimal therapy. Denies pain.         Social/Functional History  Social/Functional History  Prior Level of Assist for ADLs: Independent  Prior  Provided: Role of Therapy;Plan of Care;Transfer Training  Education Provided Comments: Disease Specific Education: Pt educated on importance of OOB mobility, prevention of complications of bedrest, and general safety during hospitalization.  Education Method: Verbal  Barriers to Learning: None  Education Outcome: Verbalized understanding      Therapy Time   Individual Concurrent Group Co-treatment   Time In 0837         Time Out 0855         Minutes 18         Timed Code Treatment Minutes: 8 Minutes       Eloina Chaudhry, [place] [id number]

## 2025-01-24 NOTE — PROGRESS NOTES
Occupational Therapy  Facility/Department: [facility] C2 CARD TELEMETRY  Occupational Therapy Initial Assessment and Discharge    Name: Venkata Parker  : 1975  MRN: [id number]  Date of Service: 2025    Discharge Recommendations:  Home with assist PRN  OT Equipment Recommendations  Equipment Needed: No     If pt is unable to be seen after this session, please let this note serve as discharge summary.  Please see case management note for discharge disposition.  Thank you.    Patient Diagnosis(es): Hypertensive urgency  Past Medical History:  has a past medical history of Aortic dissection, thoracic (HCC), Atrial fibrillation (HCC), CHF (congestive heart failure) (HCC), Diabetes mellitus (HCC), Hyperlipidemia, and Hypertension.  Past Surgical History:  has no past surgical history on file.           Assessment  Assessment: Pt is 50 yo male presenting from home w/ c/o chest pain, found to have hypertension. PLOF IND with all ADLs and mobility w/o AD. Pt is functioning at baseline this date, performing all transfers, mobility, and LB dressing IND. Pt hypertensive with short mobility near chair, unable to perform further mobility this date. Pt states he has no concerns about d/c. Pt has no further acute OT needs, will sign off. Recommend home w/ PRN upon d/c to increase safety.     Prognosis: Good  Decision Making: Low Complexity  REQUIRES OT FOLLOW-UP: No  Activity Tolerance  Activity Tolerance: Patient Tolerated treatment well     Plan  Occupational Therapy Plan  Times Per Week: d/c OT    Restrictions  Restrictions/Precautions  Restrictions/Precautions: Fall Risk  Position Activity Restriction  Other Position/Activity Restrictions: ICU monitoring    Subjective  General  Chart Reviewed: Yes, Orders, Progress Notes, History and Physical, Imaging, Labs  Patient assessed for rehabilitation services?: Yes  Family / Caregiver Present: No  Referring Practitioner: Hugh Torres MD  Diagnosis: Hypertensive

## 2025-01-24 NOTE — CARE COORDINATION
Case Management Assessment  Initial Evaluation    Date/Time of Evaluation: 1/24/2025 11:26 AM  Assessment Completed by: Tasha Aranda RN    If patient is discharged prior to next notation, then this note serves as note for discharge by case management.    Patient Name: Venkata Parker                   YOB: 1975  Diagnosis: Hypertensive urgency [I16.0]                   Date / Time: 1/23/2025  8:02 PM    Patient Admission Status: Inpatient   Readmission Risk (Low < 19, Mod (19-27), High > 27): Readmission Risk Score: 20.6    Current PCP: Jakob Jauregui APRN - CNP  PCP verified by CM? Yes (Jakob MOSS)    Chart Reviewed: Yes      History Provided by: Patient, Medical Record  Patient Orientation: Alert and Oriented    Patient Cognition: Alert    Hospitalization in the last 30 days (Readmission):  No    If yes, Readmission Assessment in CM Navigator will be completed.    Advance Directives:      Code Status: Full Code   Patient's Primary Decision Maker is: Patient Declined (Legal Next of Kin Remains as Decision Maker)      Discharge Planning:    Patient lives with: Other (Comment) (Staff at [facility]) Type of Home: Other (Comment) ([facility])  Primary Care Giver: Self  Patient Support Systems include: Friends/Neighbors, Other (Comment) (Staff At [facility])   Current Financial resources: Medicaid  Current community resources: [employer] (Resident at [facility])  Current services prior to admission: None            Current DME:              Type of Home Care services:  None    ADLS  Prior functional level: Independent in ADLs/IADLs  Current functional level: Independent in ADLs/IADLs    PT AM-PAC:   /24  OT AM-PAC:   /24    Family can provide assistance at DC: No  Would you like Case Management to discuss the discharge plan with any other family members/significant others, and if so, who? No  Plans to Return  of choice list with basic dialogue that supports the patient's individualized plan of care/goals and shares the quality data associated with the providers was provided to:     Patient Representative Name:       The Patient and/or Patient Representative Agree with the Discharge Plan?      Tasha Aranda RN  Case Management Department  Ph: [phone]

## 2025-01-24 NOTE — PLAN OF CARE
Problem: Chronic Conditions and Co-morbidities  Goal: Patient's chronic conditions and co-morbidity symptoms are monitored and maintained or improved  Outcome: Progressing  Flowsheets (Taken 1/23/2025 2030)  Care Plan - Patient's Chronic Conditions and Co-Morbidity Symptoms are Monitored and Maintained or Improved:   Monitor and assess patient's chronic conditions and comorbid symptoms for stability, deterioration, or improvement   Collaborate with multidisciplinary team to address chronic and comorbid conditions and prevent exacerbation or deterioration   Update acute care plan with appropriate goals if chronic or comorbid symptoms are exacerbated and prevent overall improvement and discharge     Problem: Discharge Planning  Goal: Discharge to home or other facility with appropriate resources  Outcome: Progressing  Flowsheets  Taken 1/23/2025 2059  Discharge to home or other facility with appropriate resources:   Identify barriers to discharge with patient and caregiver   Identify discharge learning needs (meds, wound care, etc)   Arrange for needed discharge resources and transportation as appropriate  Taken 1/23/2025 2030  Discharge to home or other facility with appropriate resources:   Identify barriers to discharge with patient and caregiver   Arrange for needed discharge resources and transportation as appropriate   Identify discharge learning needs (meds, wound care, etc)   Arrange for interpreters to assist at discharge as needed     Problem: Safety - Adult  Goal: Free from fall injury  Outcome: Progressing     Problem: ABCDS Injury Assessment  Goal: Absence of physical injury  Outcome: Progressing     Problem: Anxiety  Goal: Will report anxiety at manageable levels  Description: INTERVENTIONS:  1. Administer medication as ordered  2. Teach and rehearse alternative coping skills  3. Provide emotional support with 1:1 interaction with staff  Outcome: Progressing     Problem: Respiratory - Adult  Goal:

## 2025-01-24 NOTE — CONSULTS
[facility]           Vascular Surgery Consultation  Venkata Parker  Medical Record #: [id number]  YOB: 1975      Date of Admission:  1/23/2025  8:02 PM  Date of Consultation:  1/24/2025      PCP:  Jakob Jauregui APRN - CNP    Cardiology:  OLI Nicole MD     Chief Complaint:  chest pain    History of Present Illness:   We are asked to see this patient in consultation by AYE Torres MD  regarding aortic dissection    Venkata Parker is a 49 y.o. male who presented to [facility] ED with intermittent sharp chest pain for the past 3 days.  Pain was associated with headache. He was seen by cardiology a month ago to establish care. In the ED, his blood pressure was 215/98 and was in NSR. CTA of abdominal aorta was obtained showing abdominal aortic dissection extending to left common iliac artery with left iliac artery aneurysm. He was started on a nicardipine infusion for blood pressure control and chest pain resolved.  Vascular surgery was consulted. He was transferred to [facility] for further evaluation and treatment. Of note, patient is homeless and is residing at a drug rehab facility and does not have a mobile phone. He is also refusing venipunctures.    Past Medical History:   Diagnosis Date    Aortic dissection, thoracic (HCC) 2021    Atrial fibrillation (HCC)     CHF (congestive heart failure) ([facility])     Diabetes mellitus (HCC)     Hyperlipidemia     Hypertension    Methamphetamine abuse  Morbid obesity with BMI > 40  Descending aortic aneurysm with chronic dissection since 2/2/2023  Bipolar disease  Homeless  Right occipital stroke    pertinent surgical history.  S/p ascending aorta repair for Type A dissection 4/7/2022 - [place]    Home Medications:   Prior to Admission medications    Medication Sig Start Date End Date Taking? Authorizing Provider   buPROPion (WELLBUTRIN XL) 150 MG extended release tablet  12/20/24  Yes Provider, MD Anisa   clopidogrel

## 2025-01-24 NOTE — PROGRESS NOTES
[facility] Medicine Progress Note  V 1.6      Date of Admission: 1/23/2025    Hospital Day: 2      Chief Admission Complaint:  Chest pain, cough, headaches for 2 days       Subjective:  no new c/o    Presenting Admission History:         \"49 y.o. male who presented to [facility] initially with complaints of 2 to 3-day history of intermittent chest pain as well as headaches.       PMHx significant for paroxysmal A-fib, type 2 diabetes with hyperglycemia, HFpEF(last echo on 10/2024 showing EF of 55 to 60%), history of type a dissection status post ascending aortic aneurysm repair in 2022, chronic type B aortic dissection, history of methamphetamine use, hypertension, hyperlipidemia, GERD, mood disorder, obesity.       Patient reports that has been having 2 to 3-day history of intermittent chest pain.  Also complains of headache for 2 days which is new.  He usually does not get headaches.  Denies any lightheadedness.  Says he has been having a cough for about a day.  He lives at a facility and says most people around him are sick with something.  He denies any fevers or chills.  No other acute complaints.  He is refusing glucose checks because he has a CGM and does not want us to do regular glucose checks here.  No other acute complaints.  He notes that\" I am not sure why they sent me over here.  They did not tell me.\"     In the ED, initial blood pressure was 215/98.  He was started on a Cardene drip.  Troponins were within normal limits x 2.  UDS was negative.  CBC showed white blood cell count 8.5, hemoglobin 13.3, otherwise unremarkable.  Influenza, COVID-negative.  CTA chest showed a type III aortic dissection extending from the lower thoracic aorta distally into the abdominal aorta so CT of the abdominal aorta was performed and showed abdominal aortic dissection extending of the left common iliac artery and an aneurysm of the left common iliac artery measuring 2.2 cm in size.     ED provider discussed with  Hypertonic Saline    [] Critical electrolyte abnormalities requiring IV replacement    [x] Insulin - Scheduled/SSI or Insulin gtt    [] Anticoagulation (Heparin gtt or Coumadin - other anticoagulants in special circumstances)    [] Cardiac Medications (IV Amiodarone/Diltiazem, Tikosyn, etc)    [] Hemodialysis    [x] Other -  Pressor  [] Change in code status    [] Decision to escalate care    [] Major surgery/procedure with associated risk factors    --------------------------------------------------  C. Data (any 2)    [x] Data Review (any 3)    [x] Consultant notes from yesterday/today    [x] All available current labs reviewed interpreted for clinical significance    [x] Appropriate follow-up labs were ordered  [] Collateral history obtained     [x] Independent Interpretation of tests (any 1)    [x] Telemetry (Rhythm Strip) personally reviewed and interpreted        [] Imaging personally reviewed and interpreted     [x] Discussion (any 1)  [x] Multi-Disciplinary Rounds with Case Management  [] Discussed management of the case with           Labs:  Personally reviewed on 1/24/2025 and interpreted for clinical significance as documented above.     Recent Labs     01/23/25  1352   WBC 8.5   HGB 13.3*   HCT 40.5        Recent Labs     01/23/25  1352 01/23/25  2055     --    K 4.1  --      --    CO2 28  --    BUN 21*  --    CREATININE 0.8*  --    CALCIUM 7.9*  --    MG  --  2.18     Recent Labs     01/23/25  1352 01/23/25  1501   TROPHS 15 16     No results for input(s): \"LABA1C\" in the last 72 hours.  Recent Labs     01/23/25  1352   AST 15   ALT 16   BILITOT 0.3   ALKPHOS 54     No results for input(s): \"INR\", \"LACTA\", \"TSH\" in the last 72 hours.    Urine Cultures: No results found for: \"LABURIN\"  Blood Cultures: No results found for: \"BC\"  No results found for: \"BLOODCULT2\"  Organism: No results found for: \"ORG\"      Marques Rojo MD

## 2025-01-24 NOTE — H&P
Hospital Medicine History & Physical      Date of Admission: 1/23/2025    Date of Service:  Pt seen/examined on 1/23/2025    [x]Admitted to Inpatient with expected LOS greater than two midnights due to medical therapy.  []Placed in Observation status.    Chief Admission Complaint: Chest pain, cough, headaches for 2 days    Presenting Admission History:      49 y.o. male who presented to [facility] initially with complaints of 2 to 3-day history of intermittent chest pain as well as headaches.      PMHx significant for paroxysmal A-fib, type 2 diabetes with hyperglycemia, HFpEF(last echo on 10/2024 showing EF of 55 to 60%), history of type a dissection status post ascending aortic aneurysm repair in 2022, chronic type B aortic dissection, history of methamphetamine use, hypertension, hyperlipidemia, GERD, mood disorder, obesity.      Patient reports that has been having 2 to 3-day history of intermittent chest pain.  Also complains of headache for 2 days which is new.  He usually does not get headaches.  Denies any lightheadedness.  Says he has been having a cough for about a day.  He lives at a facility and says most people around him are sick with something.  He denies any fevers or chills.  No other acute complaints.  He is refusing glucose checks because he has a CGM and does not want us to do regular glucose checks here.  No other acute complaints.  He notes that\" I am not sure why they sent me over here.  They did not tell me.\"    In the ED, initial blood pressure was 215/98.  He was started on a Cardene drip.  Troponins were within normal limits x 2.  UDS was negative.  CBC showed white blood cell count 8.5, hemoglobin 13.3, otherwise unremarkable.  Influenza, COVID-negative.  CTA chest showed a type III aortic dissection extending from the lower thoracic aorta distally into the abdominal aorta so CT of the abdominal aorta was performed and showed abdominal aortic dissection extending of the left common  is status post sternotomy.  Visualized upper abdomen appears normal.  There is no change from prior examination.     No acute cardiopulmonary process. Mild cardiomegaly.       PCP: Jakob Jauregui, APRN - CNP    Past Medical History:        Diagnosis Date    Aortic dissection, thoracic (HCC) 2021    Atrial fibrillation (HCC)     CHF (congestive heart failure) (HCC)     Diabetes mellitus (HCC)     Hyperlipidemia     Hypertension        Past Surgical History:    History reviewed. No pertinent surgical history.    Medications Prior to Admission:   Prior to Admission medications    Medication Sig Start Date End Date Taking? Authorizing Provider   buPROPion (WELLBUTRIN XL) 150 MG extended release tablet  12/20/24  Yes ProviderAnisa MD   clopidogrel (PLAVIX) 75 MG tablet  12/23/24  Yes Provider, MD Anisa   Continuous Glucose  (DEXCOM G7 ) АННА  11/1/24  Yes ProviderAnisa MD   albuterol sulfate HFA (VENTOLIN HFA) 108 (90 Base) MCG/ACT inhaler Inhale 2 puffs into the lungs every 6 hours as needed for Wheezing   Yes Provider, MD Anisa   busPIRone (BUSPAR) 5 MG tablet Take 1 tablet by mouth 3 times daily   Yes Provider, MD Anisa   QUEtiapine (SEROQUEL) 100 MG tablet Take 1 tablet by mouth nightly   Yes Provider, MD Ansia   apixaban (ELIQUIS) 5 MG TABS tablet Take 1 tablet by mouth 2 times daily 10/12/24  Yes Provider, MD Anisa   atorvastatin (LIPITOR) 40 MG tablet Take 1 tablet by mouth daily 10/12/24  Yes Provider, MD Anisa   carvedilol (COREG) 25 MG tablet Take 2 tablets by mouth 2 times daily (with meals) 10/12/24  Yes Provider, MD Anisa   furosemide (LASIX) 40 MG tablet Take 1 tablet by mouth 2 times daily (Takes 1 tablet at 0600 and 1 tablet at 1200) 10/13/24  Yes Provider, MD Anisa   losartan (COZAAR) 100 MG tablet Take 1 tablet by mouth daily 10/13/24  Yes Provider, MD Anisa   NIFEdipine (PROCARDIA XL) 60 MG extended release tablet

## 2025-01-25 PROCEDURE — 2500000003 HC RX 250 WO HCPCS: Performed by: STUDENT IN AN ORGANIZED HEALTH CARE EDUCATION/TRAINING PROGRAM

## 2025-01-25 PROCEDURE — 6360000002 HC RX W HCPCS: Performed by: CLINICAL NURSE SPECIALIST

## 2025-01-25 PROCEDURE — 6370000000 HC RX 637 (ALT 250 FOR IP): Performed by: STUDENT IN AN ORGANIZED HEALTH CARE EDUCATION/TRAINING PROGRAM

## 2025-01-25 PROCEDURE — 2580000003 HC RX 258: Performed by: CLINICAL NURSE SPECIALIST

## 2025-01-25 PROCEDURE — 99291 CRITICAL CARE FIRST HOUR: CPT | Performed by: INTERNAL MEDICINE

## 2025-01-25 PROCEDURE — 2000000000 HC ICU R&B

## 2025-01-25 RX ADMIN — CARVEDILOL 50 MG: 25 TABLET, FILM COATED ORAL at 17:28

## 2025-01-25 RX ADMIN — LOSARTAN POTASSIUM 100 MG: 100 TABLET, FILM COATED ORAL at 08:29

## 2025-01-25 RX ADMIN — BUSPIRONE HYDROCHLORIDE 5 MG: 5 TABLET ORAL at 20:07

## 2025-01-25 RX ADMIN — SODIUM CHLORIDE 10 MG/HR: 9 INJECTION, SOLUTION INTRAVENOUS at 00:44

## 2025-01-25 RX ADMIN — NIFEDIPINE 60 MG: 30 TABLET, FILM COATED, EXTENDED RELEASE ORAL at 08:29

## 2025-01-25 RX ADMIN — SODIUM CHLORIDE 12.5 MG/HR: 9 INJECTION, SOLUTION INTRAVENOUS at 11:49

## 2025-01-25 RX ADMIN — CARVEDILOL 50 MG: 25 TABLET, FILM COATED ORAL at 08:29

## 2025-01-25 RX ADMIN — TRAZODONE HYDROCHLORIDE 50 MG: 50 TABLET ORAL at 20:06

## 2025-01-25 RX ADMIN — SODIUM CHLORIDE 15 MG/HR: 9 INJECTION, SOLUTION INTRAVENOUS at 18:36

## 2025-01-25 RX ADMIN — SODIUM CHLORIDE, PRESERVATIVE FREE 10 ML: 5 INJECTION INTRAVENOUS at 20:07

## 2025-01-25 RX ADMIN — SODIUM CHLORIDE 10 MG/HR: 9 INJECTION, SOLUTION INTRAVENOUS at 04:14

## 2025-01-25 RX ADMIN — BUPROPION HYDROCHLORIDE 150 MG: 150 TABLET, EXTENDED RELEASE ORAL at 08:29

## 2025-01-25 RX ADMIN — NIFEDIPINE 60 MG: 30 TABLET, FILM COATED, EXTENDED RELEASE ORAL at 20:07

## 2025-01-25 RX ADMIN — SODIUM CHLORIDE 15 MG/HR: 9 INJECTION, SOLUTION INTRAVENOUS at 22:24

## 2025-01-25 RX ADMIN — APIXABAN 5 MG: 5 TABLET, FILM COATED ORAL at 08:29

## 2025-01-25 RX ADMIN — FUROSEMIDE 40 MG: 40 TABLET ORAL at 09:22

## 2025-01-25 RX ADMIN — SODIUM CHLORIDE 12.5 MG/HR: 9 INJECTION, SOLUTION INTRAVENOUS at 14:03

## 2025-01-25 RX ADMIN — SODIUM CHLORIDE 10 MG/HR: 9 INJECTION, SOLUTION INTRAVENOUS at 09:32

## 2025-01-25 RX ADMIN — SODIUM CHLORIDE 12.5 MG/HR: 9 INJECTION, SOLUTION INTRAVENOUS at 16:16

## 2025-01-25 RX ADMIN — BUSPIRONE HYDROCHLORIDE 5 MG: 5 TABLET ORAL at 08:29

## 2025-01-25 RX ADMIN — ATORVASTATIN CALCIUM 40 MG: 40 TABLET, FILM COATED ORAL at 08:29

## 2025-01-25 RX ADMIN — SODIUM CHLORIDE 15 MG/HR: 9 INJECTION, SOLUTION INTRAVENOUS at 20:10

## 2025-01-25 RX ADMIN — SPIRONOLACTONE 25 MG: 25 TABLET ORAL at 08:29

## 2025-01-25 RX ADMIN — CLOPIDOGREL BISULFATE 75 MG: 75 TABLET ORAL at 08:29

## 2025-01-25 RX ADMIN — APIXABAN 5 MG: 5 TABLET, FILM COATED ORAL at 20:07

## 2025-01-25 RX ADMIN — QUETIAPINE FUMARATE 100 MG: 100 TABLET ORAL at 20:06

## 2025-01-25 NOTE — PROGRESS NOTES
Pt up to chair per his request  Did let me get his bp at this time _ see flowsheet  Pt looked at his dex com, stated that his bg was 105 - I asked him to see his dex com and it said 225. Diabetes education given and patient agreed to switch to sugar free sodas for tonight.     Electronically signed by Louise Koch RN on 1/24/2025 at 10:19 PM

## 2025-01-25 NOTE — PROGRESS NOTES
Patient refusing bath this AM. Education given.     Patient refused labs from both Jordana and Bobby from lab. Education given      >tita Cedeno

## 2025-01-25 NOTE — PROGRESS NOTES
[facility] Medicine Progress Note  V 1.6      Date of Admission: 1/23/2025    Hospital Day: 3      Chief Admission Complaint:  Chest pain, cough, headaches for 2 days       Subjective:  no new c/o    Presenting Admission History:         \"49 y.o. male who presented to [facility] initially with complaints of 2 to 3-day history of intermittent chest pain as well as headaches.       PMHx significant for paroxysmal A-fib, type 2 diabetes with hyperglycemia, HFpEF(last echo on 10/2024 showing EF of 55 to 60%), history of type a dissection status post ascending aortic aneurysm repair in 2022, chronic type B aortic dissection, history of methamphetamine use, hypertension, hyperlipidemia, GERD, mood disorder, obesity.       Patient reports that has been having 2 to 3-day history of intermittent chest pain.  Also complains of headache for 2 days which is new.  He usually does not get headaches.  Denies any lightheadedness.  Says he has been having a cough for about a day.  He lives at a facility and says most people around him are sick with something.  He denies any fevers or chills.  No other acute complaints.  He is refusing glucose checks because he has a CGM and does not want us to do regular glucose checks here.  No other acute complaints.  He notes that\" I am not sure why they sent me over here.  They did not tell me.\"     In the ED, initial blood pressure was 215/98.  He was started on a Cardene drip.  Troponins were within normal limits x 2.  UDS was negative.  CBC showed white blood cell count 8.5, hemoglobin 13.3, otherwise unremarkable.  Influenza, COVID-negative.  CTA chest showed a type III aortic dissection extending from the lower thoracic aorta distally into the abdominal aorta so CT of the abdominal aorta was performed and showed abdominal aortic dissection extending of the left common iliac artery and an aneurysm of the left common iliac artery measuring 2.2 cm in size.     ED provider discussed with

## 2025-01-25 NOTE — PROGRESS NOTES
Left AC PIV removed - patient picking at it, stating it hurts, IV will no longer flush or draw.   Right Wrist PIV tape reinforced. Visualized patient ripping tape at bottom of IV then he stated he has not touched IV. Reinforced tape and wrapped with ACE to help protect. Patient refusing all BP's - stating cuff gets tight and hurts. I asked if I could get a BP and he stated \"I'll just take it right back off if you try\". BP cuff removed per pt and thrown on floor.   Is refusing another IV stick at this time.   Patient also refusing all diuretics bc they \"make him pee and I don't like that\", per patient. Patient educated on why IV access, BP monitoring, and meds are important but patient repeatedly stating \"I don't take diuretics at night at home\". Patient given Pepsi, peanut butter, two ice creams, and large amount of cracker packets  per his request. Refusing diet soda and I attempted diabetes education about diet - pt refused.   Patient encouraged to use call light if needs bathroom or anything else. Confirmed with patient that he knows not to get up to bathroom by self.   Pt sitting up on side of bed eating and watching TV.   Electronically signed by Louise Koch RN on 1/24/2025 at 8:35 PM

## 2025-01-25 NOTE — PROGRESS NOTES
PULMONARY AND CRITICAL CARE INPATIENT NOTE        Venkata Parker   : 1975  MRN: [id number]     Admitting Physician: Hugh Torres MD  Attending Physician: Marques Rojo MD  PCP: Jakob Jauregui APRN - CNP    Admission: 2025   Date of Service: 2025    No chief complaint on file.          ASSESSMENT & PLAN       49 y.o. pleasant  male patient with:    Assessment:  Hypertensive crisis.  Possible medication noncompliance  Chronic type B aortic aneurysm, type aortic aneurysm repair in .  History of meth use.  Negative on presentation.  In rehab  Metabolic syndrome: HTN from HLD, DM2, morbid obesity class III  HFpEF  A-fib on anticoagulation  Likely undiagnosed sleep apnea with loud snoring and nocturnal hypoxia..  Tobacco dependence more than 30 pack years.  No interest in quitting  COPD.  Clinical diagnosis.  No PFTs.  On albuterol as needed  Mood disorder on multiple psych meds      Plan:              Continue to wean off nicardipine drip.  Blood pressure target less than 140  Oral hypertensive medications resumed. Might add doxazosin if still dependant on Cardene drip  Patient has no interest in smoking cessation  Patient has no interest in sleep apnea evaluation despite explaining the risks and benefits as well as treatment modalities other than CPAP.  Vascular surgery evaluated and no plan for intervention other than blood pressure control.  DuoNebs as needed    Target Hb >7, Platelets>50 unless specified otherwise.  Keep blood sugar between 140 and 180 mg/dL  Bowel regimen: Notify provider if no bowel movement for 48 hrs.  Bedsore prevention measures.   DVT prophylaxis measures.  PT/OT when patient is able to participate.  Alert provider about unnecessary catheters (central lines, Rivas catheter) or tubes or with early signs of inflammation to discontinue.      LOS: Hospital Day: 3     Code:Full Code      Critical care time spent on this patient, excluding procedures time, is ~35 minutes.

## 2025-01-25 NOTE — PROGRESS NOTES
Patient  - no insulin given. Patient is not wearing an ID bracelet - he is refusing to let me put one on him.   Patient put call light on, I entered room, and he stated he shut the IV pump down bc it was beeping. Button panel locked via back of pump - patient very upset - ripping tape off his IV in right wrist. IV no longer working. Patient stating to me \"You're aggravating\".   Electronically signed by Louise Koch RN on 1/24/2025 at 7:37 PM

## 2025-01-26 LAB
ANION GAP SERPL CALCULATED.3IONS-SCNC: 11 MMOL/L (ref 3–16)
BASOPHILS # BLD: 0.1 K/UL (ref 0–0.2)
BASOPHILS NFR BLD: 1.9 %
BUN SERPL-MCNC: 16 MG/DL (ref 7–20)
CALCIUM SERPL-MCNC: 9.2 MG/DL (ref 8.3–10.6)
CHLORIDE SERPL-SCNC: 105 MMOL/L (ref 99–110)
CO2 SERPL-SCNC: 25 MMOL/L (ref 21–32)
CREAT SERPL-MCNC: 0.7 MG/DL (ref 0.9–1.3)
DEPRECATED RDW RBC AUTO: 15.9 % (ref 12.4–15.4)
EOSINOPHIL # BLD: 0.2 K/UL (ref 0–0.6)
EOSINOPHIL NFR BLD: 3.8 %
GFR SERPLBLD CREATININE-BSD FMLA CKD-EPI: >90 ML/MIN/{1.73_M2}
GLUCOSE BLD-MCNC: 171 MG/DL (ref 70–99)
GLUCOSE SERPL-MCNC: 203 MG/DL (ref 70–99)
HCT VFR BLD AUTO: 43.1 % (ref 40.5–52.5)
HGB BLD-MCNC: 14.2 G/DL (ref 13.5–17.5)
LYMPHOCYTES # BLD: 1.5 K/UL (ref 1–5.1)
LYMPHOCYTES NFR BLD: 22.4 %
MCH RBC QN AUTO: 26.5 PG (ref 26–34)
MCHC RBC AUTO-ENTMCNC: 32.9 G/DL (ref 31–36)
MCV RBC AUTO: 80.7 FL (ref 80–100)
MONOCYTES # BLD: 0.8 K/UL (ref 0–1.3)
MONOCYTES NFR BLD: 12 %
NEUTROPHILS # BLD: 3.9 K/UL (ref 1.7–7.7)
NEUTROPHILS NFR BLD: 59.9 %
PERFORMED ON: ABNORMAL
PLATELET # BLD AUTO: 155 K/UL (ref 135–450)
PMV BLD AUTO: 8 FL (ref 5–10.5)
POTASSIUM SERPL-SCNC: 4.3 MMOL/L (ref 3.5–5.1)
RBC # BLD AUTO: 5.35 M/UL (ref 4.2–5.9)
SODIUM SERPL-SCNC: 141 MMOL/L (ref 136–145)
WBC # BLD AUTO: 6.5 K/UL (ref 4–11)

## 2025-01-26 PROCEDURE — 6360000002 HC RX W HCPCS: Performed by: CLINICAL NURSE SPECIALIST

## 2025-01-26 PROCEDURE — 6370000000 HC RX 637 (ALT 250 FOR IP): Performed by: STUDENT IN AN ORGANIZED HEALTH CARE EDUCATION/TRAINING PROGRAM

## 2025-01-26 PROCEDURE — 80048 BASIC METABOLIC PNL TOTAL CA: CPT

## 2025-01-26 PROCEDURE — 02HV33Z INSERTION OF INFUSION DEVICE INTO SUPERIOR VENA CAVA, PERCUTANEOUS APPROACH: ICD-10-PCS | Performed by: STUDENT IN AN ORGANIZED HEALTH CARE EDUCATION/TRAINING PROGRAM

## 2025-01-26 PROCEDURE — 99233 SBSQ HOSP IP/OBS HIGH 50: CPT | Performed by: INTERNAL MEDICINE

## 2025-01-26 PROCEDURE — 36569 INSJ PICC 5 YR+ W/O IMAGING: CPT

## 2025-01-26 PROCEDURE — 6370000000 HC RX 637 (ALT 250 FOR IP): Performed by: INTERNAL MEDICINE

## 2025-01-26 PROCEDURE — 2500000003 HC RX 250 WO HCPCS: Performed by: STUDENT IN AN ORGANIZED HEALTH CARE EDUCATION/TRAINING PROGRAM

## 2025-01-26 PROCEDURE — 85025 COMPLETE CBC W/AUTO DIFF WBC: CPT

## 2025-01-26 PROCEDURE — 2580000003 HC RX 258: Performed by: CLINICAL NURSE SPECIALIST

## 2025-01-26 PROCEDURE — 2000000000 HC ICU R&B

## 2025-01-26 PROCEDURE — C1751 CATH, INF, PER/CENT/MIDLINE: HCPCS

## 2025-01-26 RX ORDER — DOXAZOSIN 2 MG/1
2 TABLET ORAL EVERY 12 HOURS SCHEDULED
Status: DISCONTINUED | OUTPATIENT
Start: 2025-01-26 | End: 2025-01-28 | Stop reason: HOSPADM

## 2025-01-26 RX ADMIN — CARVEDILOL 50 MG: 25 TABLET, FILM COATED ORAL at 10:42

## 2025-01-26 RX ADMIN — SODIUM CHLORIDE 5 MG/HR: 9 INJECTION, SOLUTION INTRAVENOUS at 20:29

## 2025-01-26 RX ADMIN — TRAZODONE HYDROCHLORIDE 50 MG: 50 TABLET ORAL at 21:27

## 2025-01-26 RX ADMIN — DOXAZOSIN 2 MG: 2 TABLET ORAL at 11:40

## 2025-01-26 RX ADMIN — SODIUM CHLORIDE 15 MG/HR: 9 INJECTION, SOLUTION INTRAVENOUS at 07:41

## 2025-01-26 RX ADMIN — CLOPIDOGREL BISULFATE 75 MG: 75 TABLET ORAL at 10:43

## 2025-01-26 RX ADMIN — SODIUM CHLORIDE 15 MG/HR: 9 INJECTION, SOLUTION INTRAVENOUS at 02:00

## 2025-01-26 RX ADMIN — DOXAZOSIN 2 MG: 2 TABLET ORAL at 21:28

## 2025-01-26 RX ADMIN — SODIUM CHLORIDE 15 MG/HR: 9 INJECTION, SOLUTION INTRAVENOUS at 00:14

## 2025-01-26 RX ADMIN — BUSPIRONE HYDROCHLORIDE 5 MG: 5 TABLET ORAL at 21:27

## 2025-01-26 RX ADMIN — NIFEDIPINE 60 MG: 30 TABLET, FILM COATED, EXTENDED RELEASE ORAL at 10:42

## 2025-01-26 RX ADMIN — QUETIAPINE FUMARATE 100 MG: 100 TABLET ORAL at 21:27

## 2025-01-26 RX ADMIN — SPIRONOLACTONE 25 MG: 25 TABLET ORAL at 10:43

## 2025-01-26 RX ADMIN — APIXABAN 5 MG: 5 TABLET, FILM COATED ORAL at 21:27

## 2025-01-26 RX ADMIN — SODIUM CHLORIDE 15 MG/HR: 9 INJECTION, SOLUTION INTRAVENOUS at 04:22

## 2025-01-26 RX ADMIN — CARVEDILOL 50 MG: 25 TABLET, FILM COATED ORAL at 20:12

## 2025-01-26 RX ADMIN — LOSARTAN POTASSIUM 100 MG: 100 TABLET, FILM COATED ORAL at 10:42

## 2025-01-26 RX ADMIN — NIFEDIPINE 60 MG: 30 TABLET, FILM COATED, EXTENDED RELEASE ORAL at 21:27

## 2025-01-26 RX ADMIN — SODIUM CHLORIDE 15 MG/HR: 9 INJECTION, SOLUTION INTRAVENOUS at 06:27

## 2025-01-26 RX ADMIN — APIXABAN 5 MG: 5 TABLET, FILM COATED ORAL at 10:43

## 2025-01-26 RX ADMIN — ATORVASTATIN CALCIUM 40 MG: 40 TABLET, FILM COATED ORAL at 10:43

## 2025-01-26 RX ADMIN — BUPROPION HYDROCHLORIDE 150 MG: 150 TABLET, EXTENDED RELEASE ORAL at 10:43

## 2025-01-26 RX ADMIN — BUSPIRONE HYDROCHLORIDE 5 MG: 5 TABLET ORAL at 10:43

## 2025-01-26 RX ADMIN — SODIUM CHLORIDE, PRESERVATIVE FREE 10 ML: 5 INJECTION INTRAVENOUS at 21:29

## 2025-01-26 ASSESSMENT — PAIN SCALES - GENERAL
PAINLEVEL_OUTOF10: 0
PAINLEVEL_OUTOF10: 0

## 2025-01-26 NOTE — PROGRESS NOTES
Pt IV went bad. MD aware. PICC ordered. Pt agreeable.    Pt refusing to wear EKG monitor at this time. MD aware.

## 2025-01-26 NOTE — PROGRESS NOTES
Arrived to place PICC line with bedside RN. Pre-procedure and timeout done with RN, discussed limitations of placement and allergies.      Pt's basilic, brachial, cephalic are all easily collapsible with no indication for a clot. Vein selected is large enough for catheter. Pt tolerated sterile procedure well, with no difficulty accessing basilic vein, when accessed - blood was free flowing and non-pulsatile. Guidewire, introducer, and catheter went in smoothly.   PICC line verified with 3CG technology with peaked P-waves (please see image below). PICC tip terminates in the SVC according to [employer] 3CG tip confirmation system. PICC was seen dropping into SVC with tip tracking technology and discernable peaked p waves were noted without negative deflection.   OK to use PICC.   Please use new IV tubing when connecting to the newly placed central line.      Post procedure - reorganized pt table, placed pt in lowest position, with call light and educated on line care. Reported off to bedside RN.      Please call if you have any questions about the PICC or ML. The  will direct you to the PICC RN that is on call.      [phone]

## 2025-01-26 NOTE — PROGRESS NOTES
Patient refusing everything except a few of his pills and his BP cuff. Patient did agree to keep the BP cuff on all night tonight so we can monitor BP for cardene drip. Maxed on cardene at this time and still hypertensive but states he will not take more BP meds or diuretics.   Refusing bath and linens to be changed. Patient given case of sodas per his request. Attempted more education about meds, BP, diabetes, pt refused. States he does not want to hear it and just wants us to give him his meds like he takes them at his rehab facility.   Patient dexcom no longer working, refusing all finger sticks and insulin, and education.   Encouraged to use call light if he needs anything.     Electronically signed by Louise Koch RN on 1/25/2025 at 8:40 PM

## 2025-01-26 NOTE — PROGRESS NOTES
[facility] Medicine Progress Note  V 1.6      Date of Admission: 1/23/2025    Hospital Day: 4      Chief Admission Complaint:  Chest pain, cough, headaches for 2 days       Subjective:  no new c/o    Presenting Admission History:         \"49 y.o. male who presented to [facility] initially with complaints of 2 to 3-day history of intermittent chest pain as well as headaches.       PMHx significant for paroxysmal A-fib, type 2 diabetes with hyperglycemia, HFpEF(last echo on 10/2024 showing EF of 55 to 60%), history of type a dissection status post ascending aortic aneurysm repair in 2022, chronic type B aortic dissection, history of methamphetamine use, hypertension, hyperlipidemia, GERD, mood disorder, obesity.       Patient reports that has been having 2 to 3-day history of intermittent chest pain.  Also complains of headache for 2 days which is new.  He usually does not get headaches.  Denies any lightheadedness.  Says he has been having a cough for about a day.  He lives at a facility and says most people around him are sick with something.  He denies any fevers or chills.  No other acute complaints.  He is refusing glucose checks because he has a CGM and does not want us to do regular glucose checks here.  No other acute complaints.  He notes that\" I am not sure why they sent me over here.  They did not tell me.\"     In the ED, initial blood pressure was 215/98.  He was started on a Cardene drip.  Troponins were within normal limits x 2.  UDS was negative.  CBC showed white blood cell count 8.5, hemoglobin 13.3, otherwise unremarkable.  Influenza, COVID-negative.  CTA chest showed a type III aortic dissection extending from the lower thoracic aorta distally into the abdominal aorta so CT of the abdominal aorta was performed and showed abdominal aortic dissection extending of the left common iliac artery and an aneurysm of the left common iliac artery measuring 2.2 cm in size.     ED provider discussed with

## 2025-01-27 LAB
ALBUMIN SERPL-MCNC: 3.3 G/DL (ref 3.4–5)
ANION GAP SERPL CALCULATED.3IONS-SCNC: 6 MMOL/L (ref 3–16)
BUN SERPL-MCNC: 14 MG/DL (ref 7–20)
CALCIUM SERPL-MCNC: 8.2 MG/DL (ref 8.3–10.6)
CHLORIDE SERPL-SCNC: 104 MMOL/L (ref 99–110)
CO2 SERPL-SCNC: 29 MMOL/L (ref 21–32)
CREAT SERPL-MCNC: 0.7 MG/DL (ref 0.9–1.3)
DEPRECATED RDW RBC AUTO: 15.9 % (ref 12.4–15.4)
GFR SERPLBLD CREATININE-BSD FMLA CKD-EPI: >90 ML/MIN/{1.73_M2}
GLUCOSE BLD-MCNC: 158 MG/DL (ref 70–99)
GLUCOSE BLD-MCNC: 200 MG/DL (ref 70–99)
GLUCOSE SERPL-MCNC: 192 MG/DL (ref 70–99)
HCT VFR BLD AUTO: 38.5 % (ref 40.5–52.5)
HGB BLD-MCNC: 12.8 G/DL (ref 13.5–17.5)
MCH RBC QN AUTO: 26.4 PG (ref 26–34)
MCHC RBC AUTO-ENTMCNC: 33.3 G/DL (ref 31–36)
MCV RBC AUTO: 79.3 FL (ref 80–100)
PERFORMED ON: ABNORMAL
PERFORMED ON: ABNORMAL
PHOSPHATE SERPL-MCNC: 3.7 MG/DL (ref 2.5–4.9)
PLATELET # BLD AUTO: 154 K/UL (ref 135–450)
PMV BLD AUTO: 7.2 FL (ref 5–10.5)
POTASSIUM SERPL-SCNC: 4.1 MMOL/L (ref 3.5–5.1)
RBC # BLD AUTO: 4.86 M/UL (ref 4.2–5.9)
SODIUM SERPL-SCNC: 139 MMOL/L (ref 136–145)
WBC # BLD AUTO: 8.2 K/UL (ref 4–11)

## 2025-01-27 PROCEDURE — 6370000000 HC RX 637 (ALT 250 FOR IP): Performed by: INTERNAL MEDICINE

## 2025-01-27 PROCEDURE — 80069 RENAL FUNCTION PANEL: CPT

## 2025-01-27 PROCEDURE — 6360000002 HC RX W HCPCS: Performed by: CLINICAL NURSE SPECIALIST

## 2025-01-27 PROCEDURE — 85027 COMPLETE CBC AUTOMATED: CPT

## 2025-01-27 PROCEDURE — 99291 CRITICAL CARE FIRST HOUR: CPT | Performed by: STUDENT IN AN ORGANIZED HEALTH CARE EDUCATION/TRAINING PROGRAM

## 2025-01-27 PROCEDURE — 6370000000 HC RX 637 (ALT 250 FOR IP): Performed by: STUDENT IN AN ORGANIZED HEALTH CARE EDUCATION/TRAINING PROGRAM

## 2025-01-27 PROCEDURE — 2000000000 HC ICU R&B

## 2025-01-27 PROCEDURE — 2580000003 HC RX 258: Performed by: CLINICAL NURSE SPECIALIST

## 2025-01-27 RX ORDER — CLONIDINE HYDROCHLORIDE 0.1 MG/1
0.1 TABLET ORAL 3 TIMES DAILY
Status: DISCONTINUED | OUTPATIENT
Start: 2025-01-27 | End: 2025-01-28 | Stop reason: HOSPADM

## 2025-01-27 RX ORDER — LABETALOL HYDROCHLORIDE 5 MG/ML
10 INJECTION, SOLUTION INTRAVENOUS EVERY 4 HOURS PRN
Status: DISCONTINUED | OUTPATIENT
Start: 2025-01-27 | End: 2025-01-28 | Stop reason: HOSPADM

## 2025-01-27 RX ADMIN — CLONIDINE HYDROCHLORIDE 0.1 MG: 0.1 TABLET ORAL at 19:54

## 2025-01-27 RX ADMIN — TRAZODONE HYDROCHLORIDE 50 MG: 50 TABLET ORAL at 22:07

## 2025-01-27 RX ADMIN — BUSPIRONE HYDROCHLORIDE 5 MG: 5 TABLET ORAL at 09:56

## 2025-01-27 RX ADMIN — BUPROPION HYDROCHLORIDE 150 MG: 150 TABLET, EXTENDED RELEASE ORAL at 09:57

## 2025-01-27 RX ADMIN — CLOPIDOGREL BISULFATE 75 MG: 75 TABLET ORAL at 09:57

## 2025-01-27 RX ADMIN — QUETIAPINE FUMARATE 100 MG: 100 TABLET ORAL at 22:07

## 2025-01-27 RX ADMIN — NIFEDIPINE 60 MG: 30 TABLET, FILM COATED, EXTENDED RELEASE ORAL at 09:56

## 2025-01-27 RX ADMIN — FUROSEMIDE 40 MG: 40 TABLET ORAL at 05:27

## 2025-01-27 RX ADMIN — BUSPIRONE HYDROCHLORIDE 5 MG: 5 TABLET ORAL at 19:55

## 2025-01-27 RX ADMIN — DOXAZOSIN 2 MG: 2 TABLET ORAL at 19:54

## 2025-01-27 RX ADMIN — CLONIDINE HYDROCHLORIDE 0.1 MG: 0.1 TABLET ORAL at 09:57

## 2025-01-27 RX ADMIN — ATORVASTATIN CALCIUM 40 MG: 40 TABLET, FILM COATED ORAL at 09:56

## 2025-01-27 RX ADMIN — SODIUM CHLORIDE 5 MG/HR: 9 INJECTION, SOLUTION INTRAVENOUS at 10:08

## 2025-01-27 RX ADMIN — BUSPIRONE HYDROCHLORIDE 5 MG: 5 TABLET ORAL at 14:38

## 2025-01-27 RX ADMIN — LOSARTAN POTASSIUM 100 MG: 100 TABLET, FILM COATED ORAL at 09:56

## 2025-01-27 RX ADMIN — CARVEDILOL 50 MG: 25 TABLET, FILM COATED ORAL at 18:14

## 2025-01-27 RX ADMIN — CARVEDILOL 50 MG: 25 TABLET, FILM COATED ORAL at 09:56

## 2025-01-27 RX ADMIN — DOXAZOSIN 2 MG: 2 TABLET ORAL at 09:56

## 2025-01-27 RX ADMIN — SODIUM CHLORIDE 5 MG/HR: 9 INJECTION, SOLUTION INTRAVENOUS at 03:59

## 2025-01-27 RX ADMIN — APIXABAN 5 MG: 5 TABLET, FILM COATED ORAL at 09:57

## 2025-01-27 RX ADMIN — CLONIDINE HYDROCHLORIDE 0.1 MG: 0.1 TABLET ORAL at 14:38

## 2025-01-27 RX ADMIN — SPIRONOLACTONE 25 MG: 25 TABLET ORAL at 09:56

## 2025-01-27 RX ADMIN — APIXABAN 5 MG: 5 TABLET, FILM COATED ORAL at 19:55

## 2025-01-27 RX ADMIN — NIFEDIPINE 60 MG: 30 TABLET, FILM COATED, EXTENDED RELEASE ORAL at 19:55

## 2025-01-27 ASSESSMENT — PAIN SCALES - GENERAL
PAINLEVEL_OUTOF10: 0
PAINLEVEL_OUTOF10: 0
PAINLEVEL_OUTOF10: 5

## 2025-01-27 ASSESSMENT — PAIN DESCRIPTION - LOCATION: LOCATION: BACK

## 2025-01-27 NOTE — PROGRESS NOTES
[facility] Medicine Progress Note  V 1.6      Date of Admission: 1/23/2025    Hospital Day: 5      Chief Admission Complaint:  Chest pain, cough, headaches for 2 days     Subjective:  no complaints, resting in chair, denies having anxiety    Presenting Admission History:         \"49 y.o. male who presented to [facility] initially with complaints of 2 to 3-day history of intermittent chest pain as well as headaches.       PMHx significant for paroxysmal A-fib, type 2 diabetes with hyperglycemia, HFpEF(last echo on 10/2024 showing EF of 55 to 60%), history of type a dissection status post ascending aortic aneurysm repair in 2022, chronic type B aortic dissection, history of methamphetamine use, hypertension, hyperlipidemia, GERD, mood disorder, obesity.       Patient reports that has been having 2 to 3-day history of intermittent chest pain.  Also complains of headache for 2 days which is new.  He usually does not get headaches.  Denies any lightheadedness.  Says he has been having a cough for about a day.  He lives at a facility and says most people around him are sick with something.  He denies any fevers or chills.  No other acute complaints.  He is refusing glucose checks because he has a CGM and does not want us to do regular glucose checks here.  No other acute complaints.  He notes that\" I am not sure why they sent me over here.  They did not tell me.\"     In the ED, initial blood pressure was 215/98.  He was started on a Cardene drip.  Troponins were within normal limits x 2.  UDS was negative.  CBC showed white blood cell count 8.5, hemoglobin 13.3, otherwise unremarkable.  Influenza, COVID-negative.  CTA chest showed a type III aortic dissection extending from the lower thoracic aorta distally into the abdominal aorta so CT of the abdominal aorta was performed and showed abdominal aortic dissection extending of the left common iliac artery and an aneurysm of the left common iliac artery measuring 2.2 cm

## 2025-01-27 NOTE — PLAN OF CARE
Problem: Chronic Conditions and Co-morbidities  Goal: Patient's chronic conditions and co-morbidity symptoms are monitored and maintained or improved  Outcome: Progressing  Flowsheets (Taken 1/26/2025 2000)  Care Plan - Patient's Chronic Conditions and Co-Morbidity Symptoms are Monitored and Maintained or Improved: Monitor and assess patient's chronic conditions and comorbid symptoms for stability, deterioration, or improvement     Problem: Discharge Planning  Goal: Discharge to home or other facility with appropriate resources  Outcome: Progressing  Flowsheets (Taken 1/26/2025 2000)  Discharge to home or other facility with appropriate resources: Identify barriers to discharge with patient and caregiver     Problem: Safety - Adult  Goal: Free from fall injury  Outcome: Progressing     Problem: ABCDS Injury Assessment  Goal: Absence of physical injury  Outcome: Progressing     Problem: Anxiety  Goal: Will report anxiety at manageable levels  Description: INTERVENTIONS:  1. Administer medication as ordered  2. Teach and rehearse alternative coping skills  3. Provide emotional support with 1:1 interaction with staff  Outcome: Progressing     Problem: Coping  Goal: Pt/Family able to verbalize concerns and demonstrate effective coping strategies  Description: INTERVENTIONS:  1. Assist patient/family to identify coping skills, available support systems and cultural and spiritual values  2. Provide emotional support, including active listening and acknowledgement of concerns of patient and caregivers  3. Reduce environmental stimuli, as able  4. Instruct patient/family in relaxation techniques, as appropriate  5. Assess for spiritual pain/suffering and initiate Spiritual Care, Psychosocial Clinical Specialist consults as needed  Outcome: Progressing     Problem: Respiratory - Adult  Goal: Achieves optimal ventilation and oxygenation  Outcome: Progressing     Problem: Gastrointestinal - Adult  Goal: Maintains or returns

## 2025-01-27 NOTE — PROGRESS NOTES
Patient refused bath this morning. Education provided on the importance of daily hygiene. Care continues.         Electronically signed by Debbie Judd RN on 1/27/2025 at 6:45 AM

## 2025-01-27 NOTE — PROGRESS NOTES
Pulmonary & Critical Care Medicine ICU Progress Note      Events of Last 24 hours:   Pt sleepy this morning. He is answering some questions. He is still on the Cardene gtt.       Invasive Lines: PICC D#0      CVC D#None  Art Line D#None            Vitals:  /78   Pulse 65   Temp 98.3 °F (36.8 °C) (Oral)   Resp 17   Ht 1.829 m (6')   Wt (!) 150.7 kg (332 lb 3.7 oz)   SpO2 93%   BMI 45.06 kg/m²    Tmax:  CVP:        Intake/Output Summary (Last 24 hours) at 1/27/2025 0732  Last data filed at 1/27/2025 0582  Gross per 24 hour   Intake 10 ml   Output 1800 ml   Net -1790 ml       EXAM:  Physical Exam  Constitutional:       Appearance: He is obese. He is ill-appearing.   HENT:      Head: Normocephalic and atraumatic.      Nose: Nose normal.      Mouth/Throat:      Pharynx: No oropharyngeal exudate.   Eyes:      General: No scleral icterus.        Right eye: No discharge.         Left eye: No discharge.   Cardiovascular:      Rate and Rhythm: Normal rate.      Heart sounds: No murmur heard.     No gallop.   Pulmonary:      Effort: Pulmonary effort is normal. No respiratory distress.      Breath sounds: No wheezing or rales.   Abdominal:      General: Abdomen is flat. Bowel sounds are normal. There is no distension.      Tenderness: There is no abdominal tenderness.   Musculoskeletal:         General: No swelling.      Cervical back: Normal range of motion.   Skin:     General: Skin is warm and dry.   Neurological:      Mental Status: He is alert and oriented to person, place, and time.          Medications:  Scheduled Meds:   doxazosin  2 mg Oral 2 times per day    furosemide  40 mg Oral BID    mupirocin   Each Nostril BID    sodium chloride flush  5-40 mL IntraVENous 2 times per day    apixaban  5 mg Oral BID    atorvastatin  40 mg Oral Daily    buPROPion  150 mg Oral Daily    busPIRone  5 mg Oral TID    carvedilol  50 mg Oral BID WC    clopidogrel  75 mg Oral Daily    losartan  100 mg Oral Daily

## 2025-01-28 VITALS
HEART RATE: 88 BPM | HEIGHT: 72 IN | DIASTOLIC BLOOD PRESSURE: 52 MMHG | RESPIRATION RATE: 16 BRPM | SYSTOLIC BLOOD PRESSURE: 122 MMHG | TEMPERATURE: 97.1 F | OXYGEN SATURATION: 94 % | WEIGHT: 315 LBS | BODY MASS INDEX: 42.66 KG/M2

## 2025-01-28 PROCEDURE — 2500000003 HC RX 250 WO HCPCS: Performed by: STUDENT IN AN ORGANIZED HEALTH CARE EDUCATION/TRAINING PROGRAM

## 2025-01-28 PROCEDURE — 6370000000 HC RX 637 (ALT 250 FOR IP): Performed by: STUDENT IN AN ORGANIZED HEALTH CARE EDUCATION/TRAINING PROGRAM

## 2025-01-28 PROCEDURE — 6370000000 HC RX 637 (ALT 250 FOR IP): Performed by: INTERNAL MEDICINE

## 2025-01-28 RX ORDER — DOXAZOSIN 2 MG/1
2 TABLET ORAL EVERY 12 HOURS SCHEDULED
Qty: 60 TABLET | Refills: 1 | Status: SHIPPED | OUTPATIENT
Start: 2025-01-28

## 2025-01-28 RX ORDER — DOXAZOSIN 2 MG/1
2 TABLET ORAL EVERY 12 HOURS SCHEDULED
Qty: 60 TABLET | Refills: 1
Start: 2025-01-28 | End: 2025-01-28

## 2025-01-28 RX ORDER — CLONIDINE HYDROCHLORIDE 0.1 MG/1
0.1 TABLET ORAL EVERY 8 HOURS
Qty: 60 TABLET | Refills: 1 | Status: SHIPPED | OUTPATIENT
Start: 2025-01-28

## 2025-01-28 RX ORDER — CLONIDINE HYDROCHLORIDE 0.1 MG/1
0.1 TABLET ORAL EVERY 8 HOURS
Qty: 60 TABLET | Refills: 1
Start: 2025-01-28 | End: 2025-01-28

## 2025-01-28 RX ADMIN — CLOPIDOGREL BISULFATE 75 MG: 75 TABLET ORAL at 08:30

## 2025-01-28 RX ADMIN — CARVEDILOL 50 MG: 25 TABLET, FILM COATED ORAL at 08:29

## 2025-01-28 RX ADMIN — CLONIDINE HYDROCHLORIDE 0.1 MG: 0.1 TABLET ORAL at 08:29

## 2025-01-28 RX ADMIN — SODIUM CHLORIDE, PRESERVATIVE FREE 10 ML: 5 INJECTION INTRAVENOUS at 08:33

## 2025-01-28 RX ADMIN — APIXABAN 5 MG: 5 TABLET, FILM COATED ORAL at 08:29

## 2025-01-28 RX ADMIN — SODIUM CHLORIDE, PRESERVATIVE FREE 30 ML: 5 INJECTION INTRAVENOUS at 00:39

## 2025-01-28 RX ADMIN — BUSPIRONE HYDROCHLORIDE 5 MG: 5 TABLET ORAL at 08:29

## 2025-01-28 RX ADMIN — NIFEDIPINE 60 MG: 30 TABLET, FILM COATED, EXTENDED RELEASE ORAL at 08:29

## 2025-01-28 RX ADMIN — LOSARTAN POTASSIUM 100 MG: 100 TABLET, FILM COATED ORAL at 08:29

## 2025-01-28 RX ADMIN — SPIRONOLACTONE 25 MG: 25 TABLET ORAL at 08:30

## 2025-01-28 RX ADMIN — ATORVASTATIN CALCIUM 40 MG: 40 TABLET, FILM COATED ORAL at 08:30

## 2025-01-28 RX ADMIN — BUPROPION HYDROCHLORIDE 150 MG: 150 TABLET, EXTENDED RELEASE ORAL at 08:30

## 2025-01-28 RX ADMIN — DOXAZOSIN 2 MG: 2 TABLET ORAL at 08:29

## 2025-01-28 NOTE — CARE COORDINATION
CASE MANAGEMENT DISCHARGE SUMMARY      Discharge to: [facility]    Precertification completed: N/A  Hospital Exemption Notification (HENS) completed: N/A    IMM given: (date) N/A    New Durable Medical Equipment ordered/agency: None    Transportation:    Family/car: [facility] representative will transport               Pick-up [phone]       Confirmed discharge plan with: Patient returning to [facility] in patient rehab     Patient: yes        RN, name: Jaycee    Note: Discharging nurse to complete JAH, reconcile AVS, and place final copy with patient's discharge packet. RN to ensure that written prescriptions for  Level II medications are sent with patient to the facility as per protocol.  .Tasha Aranda RN

## 2025-01-28 NOTE — CONSULTS
Right posterior small dry, inflamed area. Possibly needle infiltration.   Recommendation   Daily cleanse posterior right arm Normal Saline and pat dry. Paint scab with betadine swab leave open to air.  Wound care to sign off please re consult if needed.

## 2025-01-28 NOTE — PLAN OF CARE
Pt removed bp cuff stating he was tired of having his bp taken.  RN explained to him that the whole reason he was here was because of his bp and that in this shift his frequency had been turned down from every 15 mins to every hour. Pt finally agreed to put bp cuff back on.    PT is refusing: tele monitoring, Finger sticks for blood glucose checks, Insulin sticks, Pulse ox monitoring, Labs unless drawn via IV, and evening diuretics    Complaints: right wrist iv infiltration painful and swollen outlined with mane    Concerns: pt wants to know when he will get metformin    Demands: reg soda, lots of extra snacks

## 2025-01-28 NOTE — DISCHARGE SUMMARY
Hospital Medicine Discharge Summary    Patient: Venkata Parker   : 1975     Hospital:  [facility]  Admit Date: 2025   Discharge Date: 2025    Disposition:  []Home   []HHC  []SNF  [x]Drug Rehab  []LTAC  []Hospice  Code status:  [x]Full  []DNR/CCA  []Limited (DNR/CCA with Do Not Intubate)  []DNRCC  Condition at Discharge: Stable  Primary Care Provider: Jakob Jauregui APRN - CNP    Admitting Provider: Hugh Torres MD  Discharge Provider: Carlos Desir MD     Discharge Diagnoses:      Active Hospital Problems    Diagnosis     Hypertensive urgency [I16.0]        Presenting Admission History:        \"49 y.o. male who presented to [facility] initially with complaints of 2 to 3-day history of intermittent chest pain as well as headaches.       PMHx significant for paroxysmal A-fib, type 2 diabetes with hyperglycemia, HFpEF(last echo on 10/2024 showing EF of 55 to 60%), history of type a dissection status post ascending aortic aneurysm repair in , chronic type B aortic dissection, history of methamphetamine use, hypertension, hyperlipidemia, GERD, mood disorder, obesity.       Patient reports that has been having 2 to 3-day history of intermittent chest pain.  Also complains of headache for 2 days which is new.  He usually does not get headaches.  Denies any lightheadedness.  Says he has been having a cough for about a day.  He lives at a facility and says most people around him are sick with something.  He denies any fevers or chills.  No other acute complaints.  He is refusing glucose checks because he has a CGM and does not want us to do regular glucose checks here.  No other acute complaints.  He notes that\" I am not sure why they sent me over here.  They did not tell me.\"     In the ED, initial blood pressure was 215/98.  He was started on a Cardene drip.  Troponins were within normal limits x 2.  UDS was negative.  CBC showed white blood cell count 8.5, hemoglobin  taking these medications    Details   doxazosin (CARDURA) 2 MG tablet Take 1 tablet by mouth every 12 hours, Disp-60 tablet, R-1NO PRINT      cloNIDine (CATAPRES) 0.1 MG tablet Take 1 tablet by mouth every 8 (eight) hours, Disp-60 tablet, R-1NO PRINT           Discharge Medication List as of 1/28/2025  1:17 PM        Discharge Medication List as of 1/28/2025  1:17 PM        CONTINUE these medications which have NOT CHANGED    Details   buPROPion (WELLBUTRIN XL) 150 MG extended release tablet Historical Med      clopidogrel (PLAVIX) 75 MG tablet Historical Med      Continuous Glucose  (DEXCOM G7 ) АННА Historical Med      albuterol sulfate HFA (VENTOLIN HFA) 108 (90 Base) MCG/ACT inhaler Inhale 2 puffs into the lungs every 6 hours as needed for WheezingHistorical Med      busPIRone (BUSPAR) 5 MG tablet Take 1 tablet by mouth 3 times dailyHistorical Med      QUEtiapine (SEROQUEL) 100 MG tablet Take 1 tablet by mouth nightlyHistorical Med      apixaban (ELIQUIS) 5 MG TABS tablet Take 1 tablet by mouth 2 times dailyHistorical Med      atorvastatin (LIPITOR) 40 MG tablet Take 1 tablet by mouth dailyHistorical Med      carvedilol (COREG) 25 MG tablet Take 2 tablets by mouth 2 times daily (with meals)Historical Med      furosemide (LASIX) 40 MG tablet Take 1 tablet by mouth 2 times daily (Takes 1 tablet at 0600 and 1 tablet at 1200)Historical Med      losartan (COZAAR) 100 MG tablet Take 1 tablet by mouth dailyHistorical Med      NIFEdipine (PROCARDIA XL) 60 MG extended release tablet Take 1 tablet by mouth 2 times dailyHistorical Med      spironolactone (ALDACTONE) 25 MG tablet Take 1 tablet by mouth 2 times dailyHistorical Med      traZODone (DESYREL) 50 MG tablet Take 1 tablet by mouth nightlyHistorical Med      nystatin (MYCOSTATIN) 783216 UNIT/GM powder Apply topically daily as needed Places under belly as needed for gaulding, Topical, Historical Med      empagliflozin (JARDIANCE) 10 MG tablet Take 1

## 2025-01-28 NOTE — PROGRESS NOTES
Pt refused to be weighed this am as well as all bp and doesn't want his lasix until the rest of his medications.

## 2025-01-28 NOTE — PLAN OF CARE
Problem: Chronic Conditions and Co-morbidities  Goal: Patient's chronic conditions and co-morbidity symptoms are monitored and maintained or improved  Outcome: Not Progressing     Problem: Discharge Planning  Goal: Discharge to home or other facility with appropriate resources  Outcome: Not Progressing     Problem: Safety - Adult  Goal: Free from fall injury  Outcome: Not Progressing     Problem: ABCDS Injury Assessment  Goal: Absence of physical injury  Outcome: Not Progressing     Problem: Anxiety  Goal: Will report anxiety at manageable levels  Description: INTERVENTIONS:  1. Administer medication as ordered  2. Teach and rehearse alternative coping skills  3. Provide emotional support with 1:1 interaction with staff  Outcome: Not Progressing     Problem: Respiratory - Adult  Goal: Achieves optimal ventilation and oxygenation  Outcome: Not Progressing     Problem: Gastrointestinal - Adult  Goal: Maintains or returns to baseline bowel function  Outcome: Not Progressing  Goal: Maintains adequate nutritional intake  Outcome: Not Progressing     Problem: Coping  Goal: Pt/Family able to verbalize concerns and demonstrate effective coping strategies  Description: INTERVENTIONS:  1. Assist patient/family to identify coping skills, available support systems and cultural and spiritual values  2. Provide emotional support, including active listening and acknowledgement of concerns of patient and caregivers  3. Reduce environmental stimuli, as able  4. Instruct patient/family in relaxation techniques, as appropriate  5. Assess for spiritual pain/suffering and initiate Spiritual Care, Psychosocial Clinical Specialist consults as needed  Outcome: Not Progressing     Problem: Pain  Goal: Verbalizes/displays adequate comfort level or baseline comfort level  Outcome: Not Progressing

## 2025-01-28 NOTE — PLAN OF CARE
Discussed risks, benefits, and alternatives with patient.    Patient reason for declining just doesn't want to do it treatment: finger pokes for sugar checks, insulin injections, labs, telemetry monitor, pulse ox monitor, full bath     The following provider was notified of patient's intent to refuse: Saroj    Feedback from provider: ok    Alternative interventions used in the meantime: none

## 2025-01-28 NOTE — PROGRESS NOTES
Pt refusing Tele, Assessments, blood draw, accu-checks, Insulin. Attempted to educate pt on the need for the above. Pt stated he understood, but \"I don't care\" Dr Desir notified. Will continue to monitor

## 2025-02-06 ENCOUNTER — APPOINTMENT (OUTPATIENT)
Dept: CT IMAGING | Age: 50
DRG: 720 | End: 2025-02-06
Payer: MEDICAID

## 2025-02-06 ENCOUNTER — HOSPITAL ENCOUNTER (INPATIENT)
Age: 50
LOS: 4 days | Discharge: ANOTHER HOSPITAL | DRG: 720 | End: 2025-02-10
Attending: STUDENT IN AN ORGANIZED HEALTH CARE EDUCATION/TRAINING PROGRAM | Admitting: INTERNAL MEDICINE
Payer: MEDICAID

## 2025-02-06 ENCOUNTER — APPOINTMENT (OUTPATIENT)
Dept: GENERAL RADIOLOGY | Age: 50
DRG: 720 | End: 2025-02-06
Payer: MEDICAID

## 2025-02-06 DIAGNOSIS — J18.9 PNEUMONIA OF RIGHT LUNG DUE TO INFECTIOUS ORGANISM, UNSPECIFIED PART OF LUNG: Primary | ICD-10-CM

## 2025-02-06 DIAGNOSIS — R09.02 HYPOXEMIA: ICD-10-CM

## 2025-02-06 DIAGNOSIS — A41.9 SEVERE SEPSIS (HCC): ICD-10-CM

## 2025-02-06 DIAGNOSIS — R65.20 SEVERE SEPSIS (HCC): ICD-10-CM

## 2025-02-06 PROBLEM — R53.1 WEAKNESS: Status: ACTIVE | Noted: 2025-02-06

## 2025-02-06 LAB
ALBUMIN SERPL-MCNC: 3.9 G/DL (ref 3.4–5)
ALBUMIN/GLOB SERPL: 1 {RATIO} (ref 1.1–2.2)
ALP SERPL-CCNC: 60 U/L (ref 40–129)
ALT SERPL-CCNC: 16 U/L (ref 10–40)
ANION GAP SERPL CALCULATED.3IONS-SCNC: 11 MMOL/L (ref 3–16)
AST SERPL-CCNC: 21 U/L (ref 15–37)
BASE EXCESS BLDV CALC-SCNC: -1.5 MMOL/L (ref -3–3)
BASOPHILS # BLD: 0 K/UL (ref 0–0.2)
BASOPHILS NFR BLD: 0.3 %
BILIRUB SERPL-MCNC: 0.4 MG/DL (ref 0–1)
BUN SERPL-MCNC: 18 MG/DL (ref 7–20)
CALCIUM SERPL-MCNC: 8.1 MG/DL (ref 8.3–10.6)
CHLORIDE SERPL-SCNC: 97 MMOL/L (ref 99–110)
CHP ED QC CHECK: YES
CO2 BLDV-SCNC: 25 MMOL/L
CO2 SERPL-SCNC: 27 MMOL/L (ref 21–32)
COHGB MFR BLDV: 1.7 % (ref 0–1.5)
CREAT SERPL-MCNC: 0.9 MG/DL (ref 0.9–1.3)
DEPRECATED RDW RBC AUTO: 16 % (ref 12.4–15.4)
EKG ATRIAL RATE: 76 BPM
EKG DIAGNOSIS: NORMAL
EKG P AXIS: 19 DEGREES
EKG P-R INTERVAL: 184 MS
EKG Q-T INTERVAL: 406 MS
EKG QRS DURATION: 120 MS
EKG QTC CALCULATION (BAZETT): 456 MS
EKG R AXIS: -63 DEGREES
EKG T AXIS: -11 DEGREES
EKG VENTRICULAR RATE: 76 BPM
EOSINOPHIL # BLD: 0.1 K/UL (ref 0–0.6)
EOSINOPHIL NFR BLD: 0.6 %
FLUAV RNA UPPER RESP QL NAA+PROBE: NEGATIVE
FLUBV AG NPH QL: NEGATIVE
GFR SERPLBLD CREATININE-BSD FMLA CKD-EPI: >90 ML/MIN/{1.73_M2}
GLUCOSE BLD-MCNC: 137 MG/DL
GLUCOSE BLD-MCNC: 137 MG/DL (ref 70–99)
GLUCOSE BLD-MCNC: 205 MG/DL (ref 70–99)
GLUCOSE BLD-MCNC: 263 MG/DL (ref 70–99)
GLUCOSE SERPL-MCNC: 220 MG/DL (ref 70–99)
HCO3 BLDV-SCNC: 23.8 MMOL/L (ref 23–29)
HCT VFR BLD AUTO: 44 % (ref 40.5–52.5)
HGB BLD-MCNC: 14.4 G/DL (ref 13.5–17.5)
LACTATE BLDV-SCNC: 1.4 MMOL/L (ref 0.4–1.9)
LACTATE BLDV-SCNC: 2.3 MMOL/L (ref 0.4–1.9)
LYMPHOCYTES # BLD: 0.3 K/UL (ref 1–5.1)
LYMPHOCYTES NFR BLD: 2.6 %
MCH RBC QN AUTO: 26.4 PG (ref 26–34)
MCHC RBC AUTO-ENTMCNC: 32.8 G/DL (ref 31–36)
MCV RBC AUTO: 80.5 FL (ref 80–100)
METHGB MFR BLDV: 0.2 %
MONOCYTES # BLD: 0.5 K/UL (ref 0–1.3)
MONOCYTES NFR BLD: 3.6 %
NEUTROPHILS # BLD: 12 K/UL (ref 1.7–7.7)
NEUTROPHILS NFR BLD: 92.9 %
NT-PROBNP SERPL-MCNC: 823 PG/ML (ref 0–124)
O2 CT VFR BLDV CALC: 18 VOL %
O2 THERAPY: ABNORMAL
PCO2 BLDV: 42 MMHG (ref 40–50)
PERFORMED ON: ABNORMAL
PH BLDV: 7.37 [PH] (ref 7.35–7.45)
PLATELET # BLD AUTO: 150 K/UL (ref 135–450)
PMV BLD AUTO: 8 FL (ref 5–10.5)
PO2 BLDV: 49.4 MMHG (ref 25–40)
POTASSIUM SERPL-SCNC: 4.4 MMOL/L (ref 3.5–5.1)
PROCALCITONIN SERPL IA-MCNC: 0.25 NG/ML (ref 0–0.15)
PROT SERPL-MCNC: 7.7 G/DL (ref 6.4–8.2)
RBC # BLD AUTO: 5.46 M/UL (ref 4.2–5.9)
SAO2 % BLDV: 84 %
SARS-COV-2 RDRP RESP QL NAA+PROBE: NOT DETECTED
SODIUM SERPL-SCNC: 135 MMOL/L (ref 136–145)
TROPONIN, HIGH SENSITIVITY: 20 NG/L (ref 0–22)
TROPONIN, HIGH SENSITIVITY: 20 NG/L (ref 0–22)
WBC # BLD AUTO: 13 K/UL (ref 4–11)

## 2025-02-06 PROCEDURE — 2500000003 HC RX 250 WO HCPCS: Performed by: NURSE PRACTITIONER

## 2025-02-06 PROCEDURE — 2580000003 HC RX 258: Performed by: NURSE PRACTITIONER

## 2025-02-06 PROCEDURE — 6370000000 HC RX 637 (ALT 250 FOR IP): Performed by: PHYSICIAN ASSISTANT

## 2025-02-06 PROCEDURE — 84484 ASSAY OF TROPONIN QUANT: CPT

## 2025-02-06 PROCEDURE — 83036 HEMOGLOBIN GLYCOSYLATED A1C: CPT

## 2025-02-06 PROCEDURE — 82803 BLOOD GASES ANY COMBINATION: CPT

## 2025-02-06 PROCEDURE — 6360000002 HC RX W HCPCS: Performed by: PHYSICIAN ASSISTANT

## 2025-02-06 PROCEDURE — 83880 ASSAY OF NATRIURETIC PEPTIDE: CPT

## 2025-02-06 PROCEDURE — 87040 BLOOD CULTURE FOR BACTERIA: CPT

## 2025-02-06 PROCEDURE — 93010 ELECTROCARDIOGRAM REPORT: CPT | Performed by: INTERNAL MEDICINE

## 2025-02-06 PROCEDURE — 87635 SARS-COV-2 COVID-19 AMP PRB: CPT

## 2025-02-06 PROCEDURE — 96375 TX/PRO/DX INJ NEW DRUG ADDON: CPT

## 2025-02-06 PROCEDURE — 71046 X-RAY EXAM CHEST 2 VIEWS: CPT

## 2025-02-06 PROCEDURE — 96365 THER/PROPH/DIAG IV INF INIT: CPT

## 2025-02-06 PROCEDURE — 99223 1ST HOSP IP/OBS HIGH 75: CPT

## 2025-02-06 PROCEDURE — 84145 PROCALCITONIN (PCT): CPT

## 2025-02-06 PROCEDURE — 94761 N-INVAS EAR/PLS OXIMETRY MLT: CPT

## 2025-02-06 PROCEDURE — 2580000003 HC RX 258: Performed by: PHYSICIAN ASSISTANT

## 2025-02-06 PROCEDURE — 83605 ASSAY OF LACTIC ACID: CPT

## 2025-02-06 PROCEDURE — 6370000000 HC RX 637 (ALT 250 FOR IP): Performed by: NURSE PRACTITIONER

## 2025-02-06 PROCEDURE — 87804 INFLUENZA ASSAY W/OPTIC: CPT

## 2025-02-06 PROCEDURE — 70450 CT HEAD/BRAIN W/O DYE: CPT

## 2025-02-06 PROCEDURE — 6370000000 HC RX 637 (ALT 250 FOR IP): Performed by: INTERNAL MEDICINE

## 2025-02-06 PROCEDURE — 36415 COLL VENOUS BLD VENIPUNCTURE: CPT

## 2025-02-06 PROCEDURE — 6370000000 HC RX 637 (ALT 250 FOR IP): Performed by: STUDENT IN AN ORGANIZED HEALTH CARE EDUCATION/TRAINING PROGRAM

## 2025-02-06 PROCEDURE — 99285 EMERGENCY DEPT VISIT HI MDM: CPT

## 2025-02-06 PROCEDURE — 6360000002 HC RX W HCPCS: Performed by: NURSE PRACTITIONER

## 2025-02-06 PROCEDURE — 71260 CT THORAX DX C+: CPT

## 2025-02-06 PROCEDURE — 94640 AIRWAY INHALATION TREATMENT: CPT

## 2025-02-06 PROCEDURE — 1200000000 HC SEMI PRIVATE

## 2025-02-06 PROCEDURE — 80053 COMPREHEN METABOLIC PANEL: CPT

## 2025-02-06 PROCEDURE — 93005 ELECTROCARDIOGRAM TRACING: CPT | Performed by: PHYSICIAN ASSISTANT

## 2025-02-06 PROCEDURE — 6360000004 HC RX CONTRAST MEDICATION: Performed by: STUDENT IN AN ORGANIZED HEALTH CARE EDUCATION/TRAINING PROGRAM

## 2025-02-06 PROCEDURE — 85025 COMPLETE CBC W/AUTO DIFF WBC: CPT

## 2025-02-06 RX ORDER — IOPAMIDOL 755 MG/ML
75 INJECTION, SOLUTION INTRAVASCULAR
Status: COMPLETED | OUTPATIENT
Start: 2025-02-06 | End: 2025-02-06

## 2025-02-06 RX ORDER — LOSARTAN POTASSIUM 100 MG/1
100 TABLET ORAL DAILY
Status: DISCONTINUED | OUTPATIENT
Start: 2025-02-06 | End: 2025-02-10 | Stop reason: HOSPADM

## 2025-02-06 RX ORDER — ACETAMINOPHEN 325 MG/1
650 TABLET ORAL EVERY 6 HOURS PRN
Status: DISCONTINUED | OUTPATIENT
Start: 2025-02-06 | End: 2025-02-10 | Stop reason: HOSPADM

## 2025-02-06 RX ORDER — SPIRONOLACTONE 25 MG/1
25 TABLET ORAL DAILY
Status: DISCONTINUED | OUTPATIENT
Start: 2025-02-07 | End: 2025-02-10 | Stop reason: HOSPADM

## 2025-02-06 RX ORDER — ACETAMINOPHEN 650 MG/1
650 SUPPOSITORY RECTAL EVERY 6 HOURS PRN
Status: DISCONTINUED | OUTPATIENT
Start: 2025-02-06 | End: 2025-02-10 | Stop reason: HOSPADM

## 2025-02-06 RX ORDER — SODIUM CHLORIDE 0.9 % (FLUSH) 0.9 %
5-40 SYRINGE (ML) INJECTION EVERY 12 HOURS SCHEDULED
Status: DISCONTINUED | OUTPATIENT
Start: 2025-02-06 | End: 2025-02-10 | Stop reason: HOSPADM

## 2025-02-06 RX ORDER — CARVEDILOL 25 MG/1
50 TABLET ORAL 2 TIMES DAILY WITH MEALS
Status: DISCONTINUED | OUTPATIENT
Start: 2025-02-06 | End: 2025-02-10 | Stop reason: HOSPADM

## 2025-02-06 RX ORDER — IPRATROPIUM BROMIDE AND ALBUTEROL SULFATE 2.5; .5 MG/3ML; MG/3ML
1 SOLUTION RESPIRATORY (INHALATION) ONCE
Status: COMPLETED | OUTPATIENT
Start: 2025-02-06 | End: 2025-02-06

## 2025-02-06 RX ORDER — ONDANSETRON 4 MG/1
4 TABLET, ORALLY DISINTEGRATING ORAL EVERY 8 HOURS PRN
Status: DISCONTINUED | OUTPATIENT
Start: 2025-02-06 | End: 2025-02-10 | Stop reason: HOSPADM

## 2025-02-06 RX ORDER — CLOPIDOGREL BISULFATE 75 MG/1
75 TABLET ORAL DAILY
Status: DISCONTINUED | OUTPATIENT
Start: 2025-02-06 | End: 2025-02-10 | Stop reason: HOSPADM

## 2025-02-06 RX ORDER — BUPROPION HYDROCHLORIDE 150 MG/1
150 TABLET ORAL DAILY
Status: DISCONTINUED | OUTPATIENT
Start: 2025-02-06 | End: 2025-02-10 | Stop reason: HOSPADM

## 2025-02-06 RX ORDER — BUSPIRONE HYDROCHLORIDE 5 MG/1
5 TABLET ORAL 3 TIMES DAILY
Status: DISCONTINUED | OUTPATIENT
Start: 2025-02-06 | End: 2025-02-10 | Stop reason: HOSPADM

## 2025-02-06 RX ORDER — POLYETHYLENE GLYCOL 3350 17 G/17G
17 POWDER, FOR SOLUTION ORAL DAILY PRN
Status: DISCONTINUED | OUTPATIENT
Start: 2025-02-06 | End: 2025-02-10 | Stop reason: HOSPADM

## 2025-02-06 RX ORDER — NIFEDIPINE 30 MG/1
60 TABLET, EXTENDED RELEASE ORAL 2 TIMES DAILY
Status: DISCONTINUED | OUTPATIENT
Start: 2025-02-06 | End: 2025-02-10 | Stop reason: HOSPADM

## 2025-02-06 RX ORDER — DOXAZOSIN 2 MG/1
2 TABLET ORAL EVERY 12 HOURS SCHEDULED
Status: DISCONTINUED | OUTPATIENT
Start: 2025-02-06 | End: 2025-02-10 | Stop reason: HOSPADM

## 2025-02-06 RX ORDER — ONDANSETRON 2 MG/ML
4 INJECTION INTRAMUSCULAR; INTRAVENOUS EVERY 6 HOURS PRN
Status: DISCONTINUED | OUTPATIENT
Start: 2025-02-06 | End: 2025-02-10 | Stop reason: HOSPADM

## 2025-02-06 RX ORDER — GLUCAGON 1 MG/ML
1 KIT INJECTION PRN
Status: DISCONTINUED | OUTPATIENT
Start: 2025-02-06 | End: 2025-02-10 | Stop reason: HOSPADM

## 2025-02-06 RX ORDER — CLONIDINE HYDROCHLORIDE 0.1 MG/1
0.1 TABLET ORAL EVERY 8 HOURS
Status: DISCONTINUED | OUTPATIENT
Start: 2025-02-06 | End: 2025-02-10 | Stop reason: HOSPADM

## 2025-02-06 RX ORDER — ACETAMINOPHEN 500 MG
1000 TABLET ORAL
Status: COMPLETED | OUTPATIENT
Start: 2025-02-06 | End: 2025-02-06

## 2025-02-06 RX ORDER — ALBUTEROL SULFATE 90 UG/1
2 INHALANT RESPIRATORY (INHALATION)
Status: DISCONTINUED | OUTPATIENT
Start: 2025-02-07 | End: 2025-02-10 | Stop reason: HOSPADM

## 2025-02-06 RX ORDER — FUROSEMIDE 40 MG/1
40 TABLET ORAL 2 TIMES DAILY WITH MEALS
Status: DISCONTINUED | OUTPATIENT
Start: 2025-02-06 | End: 2025-02-10 | Stop reason: HOSPADM

## 2025-02-06 RX ORDER — SODIUM CHLORIDE 0.9 % (FLUSH) 0.9 %
5-40 SYRINGE (ML) INJECTION PRN
Status: DISCONTINUED | OUTPATIENT
Start: 2025-02-06 | End: 2025-02-10 | Stop reason: HOSPADM

## 2025-02-06 RX ORDER — ALBUTEROL SULFATE 90 UG/1
2 INHALANT RESPIRATORY (INHALATION) EVERY 6 HOURS PRN
Status: DISCONTINUED | OUTPATIENT
Start: 2025-02-06 | End: 2025-02-10 | Stop reason: HOSPADM

## 2025-02-06 RX ORDER — INSULIN LISPRO 100 [IU]/ML
0-4 INJECTION, SOLUTION INTRAVENOUS; SUBCUTANEOUS
Status: DISCONTINUED | OUTPATIENT
Start: 2025-02-06 | End: 2025-02-10 | Stop reason: HOSPADM

## 2025-02-06 RX ORDER — 0.9 % SODIUM CHLORIDE 0.9 %
30 INTRAVENOUS SOLUTION INTRAVENOUS ONCE
Status: COMPLETED | OUTPATIENT
Start: 2025-02-06 | End: 2025-02-06

## 2025-02-06 RX ORDER — SODIUM CHLORIDE 9 MG/ML
INJECTION, SOLUTION INTRAVENOUS PRN
Status: DISCONTINUED | OUTPATIENT
Start: 2025-02-06 | End: 2025-02-10 | Stop reason: HOSPADM

## 2025-02-06 RX ORDER — VANCOMYCIN 1.25 G/250ML
1750 INJECTION, SOLUTION INTRAVENOUS EVERY 12 HOURS
Status: DISCONTINUED | OUTPATIENT
Start: 2025-02-06 | End: 2025-02-10 | Stop reason: HOSPADM

## 2025-02-06 RX ORDER — DEXTROSE MONOHYDRATE 100 MG/ML
INJECTION, SOLUTION INTRAVENOUS CONTINUOUS PRN
Status: DISCONTINUED | OUTPATIENT
Start: 2025-02-06 | End: 2025-02-10 | Stop reason: HOSPADM

## 2025-02-06 RX ORDER — TRAZODONE HYDROCHLORIDE 50 MG/1
50 TABLET, FILM COATED ORAL NIGHTLY
Status: DISCONTINUED | OUTPATIENT
Start: 2025-02-06 | End: 2025-02-07

## 2025-02-06 RX ORDER — QUETIAPINE FUMARATE 100 MG/1
100 TABLET, FILM COATED ORAL NIGHTLY
Status: DISCONTINUED | OUTPATIENT
Start: 2025-02-06 | End: 2025-02-10 | Stop reason: HOSPADM

## 2025-02-06 RX ORDER — ATORVASTATIN CALCIUM 40 MG/1
40 TABLET, FILM COATED ORAL DAILY
Status: DISCONTINUED | OUTPATIENT
Start: 2025-02-06 | End: 2025-02-10 | Stop reason: HOSPADM

## 2025-02-06 RX ADMIN — LOSARTAN POTASSIUM 100 MG: 100 TABLET, FILM COATED ORAL at 17:41

## 2025-02-06 RX ADMIN — CEFEPIME 2000 MG: 2 INJECTION, POWDER, FOR SOLUTION INTRAVENOUS at 21:57

## 2025-02-06 RX ADMIN — IOPAMIDOL 75 ML: 755 INJECTION, SOLUTION INTRAVENOUS at 10:50

## 2025-02-06 RX ADMIN — FUROSEMIDE 40 MG: 40 TABLET ORAL at 17:41

## 2025-02-06 RX ADMIN — ACETAMINOPHEN 650 MG: 325 TABLET ORAL at 22:06

## 2025-02-06 RX ADMIN — BUSPIRONE HYDROCHLORIDE 5 MG: 5 TABLET ORAL at 21:47

## 2025-02-06 RX ADMIN — VANCOMYCIN 1750 MG: 1.25 INJECTION, SOLUTION INTRAVENOUS at 23:22

## 2025-02-06 RX ADMIN — IPRATROPIUM BROMIDE AND ALBUTEROL SULFATE 1 DOSE: 2.5; .5 SOLUTION RESPIRATORY (INHALATION) at 09:52

## 2025-02-06 RX ADMIN — INSULIN LISPRO 1 UNITS: 100 INJECTION, SOLUTION INTRAVENOUS; SUBCUTANEOUS at 21:52

## 2025-02-06 RX ADMIN — CARVEDILOL 50 MG: 25 TABLET, FILM COATED ORAL at 17:40

## 2025-02-06 RX ADMIN — BUPROPION HYDROCHLORIDE 150 MG: 150 TABLET, FILM COATED, EXTENDED RELEASE ORAL at 17:41

## 2025-02-06 RX ADMIN — SODIUM CHLORIDE 2328 ML: 9 INJECTION, SOLUTION INTRAVENOUS at 10:33

## 2025-02-06 RX ADMIN — NIFEDIPINE 60 MG: 30 TABLET, FILM COATED, EXTENDED RELEASE ORAL at 21:48

## 2025-02-06 RX ADMIN — ATORVASTATIN CALCIUM 40 MG: 40 TABLET, FILM COATED ORAL at 17:40

## 2025-02-06 RX ADMIN — DOXAZOSIN MESYLATE 2 MG: 2 TABLET ORAL at 21:48

## 2025-02-06 RX ADMIN — SODIUM CHLORIDE, PRESERVATIVE FREE 10 ML: 5 INJECTION INTRAVENOUS at 21:49

## 2025-02-06 RX ADMIN — ALBUTEROL SULFATE 2 PUFF: 90 AEROSOL, METERED RESPIRATORY (INHALATION) at 18:55

## 2025-02-06 RX ADMIN — VANCOMYCIN HYDROCHLORIDE 2500 MG: 1 INJECTION, POWDER, LYOPHILIZED, FOR SOLUTION INTRAVENOUS at 11:38

## 2025-02-06 RX ADMIN — CLOPIDOGREL BISULFATE 75 MG: 75 TABLET ORAL at 17:41

## 2025-02-06 RX ADMIN — QUETIAPINE FUMARATE 100 MG: 100 TABLET ORAL at 21:48

## 2025-02-06 RX ADMIN — CEFEPIME 2000 MG: 2 INJECTION, POWDER, FOR SOLUTION INTRAVENOUS at 10:18

## 2025-02-06 RX ADMIN — EMPAGLIFLOZIN 10 MG: 10 TABLET, FILM COATED ORAL at 17:41

## 2025-02-06 RX ADMIN — ACETAMINOPHEN 1000 MG: 500 TABLET ORAL at 12:18

## 2025-02-06 RX ADMIN — APIXABAN 5 MG: 5 TABLET, FILM COATED ORAL at 21:48

## 2025-02-06 RX ADMIN — BUSPIRONE HYDROCHLORIDE 5 MG: 5 TABLET ORAL at 17:40

## 2025-02-06 ASSESSMENT — PAIN SCALES - GENERAL
PAINLEVEL_OUTOF10: 10
PAINLEVEL_OUTOF10: 10

## 2025-02-06 ASSESSMENT — PAIN DESCRIPTION - ORIENTATION: ORIENTATION: LEFT

## 2025-02-06 ASSESSMENT — PAIN DESCRIPTION - LOCATION: LOCATION: BACK

## 2025-02-06 ASSESSMENT — PAIN DESCRIPTION - DESCRIPTORS: DESCRIPTORS: ACHING;DISCOMFORT

## 2025-02-06 NOTE — ED NOTES
Pt requests to sit in chair. RN got barichair and placed to bedside. Pt ambulates from stretcher to chair. Pt linens changed again. No other needs at this time.

## 2025-02-06 NOTE — ED PROVIDER NOTES
I independently examined and evaluated Venkata Parker.  I personally saw the patient and performed a substantive portion of the visit including all aspects of the medical decision making.    In brief, this 49-year-old male is presenting due to cough and shortness of breath and generalized weakness.  Recently hospitalized due to an aortic dissection and chest pain, no surgical intervention at that time.  States that he has been coughing and feeling more short of breath that started this morning.  Also lost his balance and fell today.    Focused exam revealed   General: Alert, uncomfortable and ill-appearing  Skin: warm, intact, no pallor noted   Head: Normocephalic, atraumatic   Eye: Normal conjunctiva   Cardiac: Normal peripheral perfusion  Respiratory: No acute distress   Musculoskeletal: No deformity, full ROM.   Neurological: alert and oriented, normal sensory and motor observed.   Psychiatric: Cooperative    ED course: Patient does have a mild leukocytosis on lab work at 13.  CTPA obtained and shows multifocal right-sided pneumonia.  Given broad-spectrum antibiotics due to his recent hospitalization.  Will be admitted for continued treatment.    ECG    The Ekg interpreted by me shows sinus rhythm with a rate of 76 bpm.  Left axis deviation.  No acute injury pattern.  , , QTc 456.    No significant change from prior EKG dated 1/23/2025    All diagnostic, treatment, and disposition decisions were made by myself in conjunction with the advanced practice provider/resident.    I personally saw the patient and made/approved the management plan and take responsibility for the patient management.     For all further details of the patient's emergency department visit, please see the advanced practice provider's/resident's documentation.     Jakob Tirado,   02/06/25 1152

## 2025-02-06 NOTE — ED NOTES
Blood Culture set # 2 drawn from [facility] at 1043.  Site cleaned with Prevantics for 30 seconds and allowed to fully dry. Utilized waste tube. Pt tolerated well. Specimens labeled and sent to lab.

## 2025-02-06 NOTE — H&P
Updated: 02/06/25 0930     Rapid Influenza A Ag Negative     Rapid Influenza B Ag Negative    COVID-19 & Influenza Combo [[id number]] Collected: 01/23/25 1513    Order Status: Completed Specimen: Nasopharyngeal Swab Updated: 01/23/25 1539     SARS-CoV-2 RNA, RT PCR NOT DETECTED     Comment: Not Detected results do not preclude SARS-CoV-2 infection and  should not be used as the sole basis for patient management  decisions.  Results must be combined with clinical observations,  patient history, and epidemiological information.  Testing was performed using MILO VINAY SARS-CoV-2 and Influenza A/B  nucleic acid assay. This test is a multiplex Real-Time Reverse  Transcriptase Polymerase Chain Reaction (RT-PCR)-based in vitro  diagnostic test intended for the qualitative detection of nucleic  acids from SARS-CoV-2, influenza A, and influenza B in nasopharyngeal  and nasal swab specimens for use under the FDA’s Emergency Use  Authorization (EUA) only.    Patient Fact Sheet:  https://www.fda.gov/media/125304/download  Provider Fact Sheet: https://www.fda.gov/media/293600/download  EUA: https://www.fda.gov/media/706089/download  IFU: https://www.fda.gov/media/272895/download    Methodology:  RT-PCR          Influenza A NOT DETECTED     Influenza B NOT DETECTED            EKG:  I have reviewed the EKG with the following interpretation:   Encounter Date: 02/06/25   EKG 12 Lead   Result Value    Ventricular Rate 76    Atrial Rate 76    P-R Interval 184    QRS Duration 120    Q-T Interval 406    QTc Calculation (Bazett) 456    P Axis 19    R Axis -63    T Axis -11    Diagnosis      Normal sinus rhythmPossible Left atrial enlargementNon-specific intra-ventricular conduction delayLeft axis deviationST abnormality inferior leads consider ischemiaAbnormal ECGConfirmed by ROBERTO OSULLIVAN MD ([id number]) on 2/6/2025 1:23:08 PM        RADIOLOGY  CT CHEST PULMONARY EMBOLISM W CONTRAST   Final Result   1. Negative for pulmonary embolus.   2.

## 2025-02-06 NOTE — ED PROVIDER NOTES
[facility] EMERGENCY DEPARTMENT  EMERGENCY DEPARTMENT ENCOUNTER        Pt Name: Venkata Parker  MRN: [id number]  Birthdate 1975  Date of evaluation: 2/6/2025  Provider: Radha Abdul PA-C  PCP: Jakob Jauregui APRN - CNP  Note Started: 9:17 AM EST 2/6/25       I have seen and evaluated this patient with my supervising physicianJkaob Tirado MD.      CHIEF COMPLAINT       Chief Complaint   Patient presents with    Illness     Pt comes from [facility] with reports of syncope. Per patient, states he did not pass out but \"lost my balance\". Pt reports full body weakness, cough, fatigue, fever.        HISTORY OF PRESENT ILLNESS: 1 or more Elements             Venkata Parker is a 49 y.o. male who presents with complaint of cough, generalized weakness myalgias that started this morning.  He states he was not experiencing any symptoms yesterday.  He feels as if he \"lost my balance \", however patient also endorses that he does not remember happened.  He is currently at [facility] for treatment.  When asked what recreational drugs he used he said \"a lot \".  He does endorse some shortness of breath but denies any chest pain.  Denies any abdominal pain, nausea, vomiting, dysuria or hematuria.  Denies any edema in his lower extremities.    Nursing Notes were all reviewed and agreed with or any disagreements were addressed in the HPI.    REVIEW OF SYSTEMS :      Review of Systems   All other systems reviewed and are negative.      Positives and Pertinent negatives as per HPI.     SURGICAL HISTORY   History reviewed. No pertinent surgical history.    CURRENTMEDICATIONS       Previous Medications    ALBUTEROL SULFATE HFA (VENTOLIN HFA) 108 (90 BASE) MCG/ACT INHALER    Inhale 2 puffs into the lungs every 6 hours as needed for Wheezing    APIXABAN (ELIQUIS) 5 MG TABS TABLET    Take 1 tablet by mouth 2 times daily    ATORVASTATIN (LIPITOR) 40 MG TABLET    Take 1 tablet by mouth daily    BUPROPION

## 2025-02-06 NOTE — ED NOTES
Pt arrives disgruntled and yelling at staff. Pt reports he needs to urinate and starts to urinate on self. RN and tech cleans up pt and places into gown. Pt clothing placed in belongings bag. Pt uses urinal for remainder. Pt reports \"I need water. You need to get me some\". RN explained that he cannot have any at this time until provider sees him. Pt then says \"You aren't going to stick me. I need a PICC line\". RN states that he does not have any blood work ordered yet and continues to triage pt. Pt placed on CMU, pulse ox, NIBP. Pt sts that the facility he is at has COVID and Flu + patients all throughout.

## 2025-02-06 NOTE — ED NOTES
Pt is incontinent. Pt knows when he is going to go but states \"I can't make it because I'm on lasix\". Full linen change performed with RN and tech. RN offered male purewick. Pt declines and states \"I'll just have to deal with it\".

## 2025-02-06 NOTE — ED NOTES
Blood Culture set # 1 drawn from [facility]  at 0950.  Site cleaned with Prevantics for 30 seconds and allowed to fully dry. Utilized waste tube. Pt tolerated well. Specimens labeled and sent to lab.

## 2025-02-06 NOTE — CONSULTS
[facility]   Pharmacy Pharmacokinetic Monitoring Service - Vancomycin     Venkata Parker is a 49 y.o. male starting on vancomycin therapy for nosocomial pneumonia x 7 days. Pharmacy consulted by DEMAR Armstrong for monitoring and adjustment.    Target Concentration: Goal AUC/RAMYA 400-600 mg*hr/L    Additional Antimicrobials: Cefepime    Pertinent Laboratory Values:   Wt Readings from Last 1 Encounters:   02/06/25 136.1 kg (300 lb)     Temp Readings from Last 1 Encounters:   02/06/25 99.9 °F (37.7 °C) (Oral)     Estimated Creatinine Clearance: 145 mL/min (based on SCr of 0.9 mg/dL).  Recent Labs     02/06/25  0950   CREATININE 0.9   BUN 18   WBC 13.0*     Procalcitonin: 0.25 (02/06/25)    MRSA Nasal Swab: was ordered by provider, awaiting results.    Plan:  Dosing recommendations based on Bayesian software  Patient received vancomycin 2500 mg IVPB x one dose in the ED at 1138 today. Will start vancomycin 1750 mg IV every 12 hours.  Anticipated AUC of 480 and trough concentration of 11 at steady state  Renal labs as indicated   Vancomycin trough level ordered for 02/07 at 2200   Pharmacy will continue to monitor patient and adjust therapy as indicated    Thank you for the consult.

## 2025-02-07 PROBLEM — I25.10 CORONARY ARTERY DISEASE DUE TO LIPID RICH PLAQUE: Status: ACTIVE | Noted: 2025-02-07

## 2025-02-07 PROBLEM — I25.83 CORONARY ARTERY DISEASE DUE TO LIPID RICH PLAQUE: Status: ACTIVE | Noted: 2025-02-07

## 2025-02-07 LAB
EST. AVERAGE GLUCOSE BLD GHB EST-MCNC: 151.3 MG/DL
GLUCOSE BLD-MCNC: 121 MG/DL (ref 70–99)
GLUCOSE BLD-MCNC: 123 MG/DL (ref 70–99)
GLUCOSE BLD-MCNC: 181 MG/DL (ref 70–99)
GLUCOSE BLD-MCNC: 209 MG/DL (ref 70–99)
HBA1C MFR BLD: 6.9 %
PERFORMED ON: ABNORMAL

## 2025-02-07 PROCEDURE — 1200000000 HC SEMI PRIVATE

## 2025-02-07 PROCEDURE — 99232 SBSQ HOSP IP/OBS MODERATE 35: CPT | Performed by: INTERNAL MEDICINE

## 2025-02-07 PROCEDURE — 94761 N-INVAS EAR/PLS OXIMETRY MLT: CPT

## 2025-02-07 PROCEDURE — 2500000003 HC RX 250 WO HCPCS: Performed by: NURSE PRACTITIONER

## 2025-02-07 PROCEDURE — 6370000000 HC RX 637 (ALT 250 FOR IP): Performed by: NURSE PRACTITIONER

## 2025-02-07 PROCEDURE — 2580000003 HC RX 258: Performed by: INTERNAL MEDICINE

## 2025-02-07 PROCEDURE — 6370000000 HC RX 637 (ALT 250 FOR IP): Performed by: INTERNAL MEDICINE

## 2025-02-07 PROCEDURE — 92610 EVALUATE SWALLOWING FUNCTION: CPT

## 2025-02-07 PROCEDURE — 6360000002 HC RX W HCPCS: Performed by: INTERNAL MEDICINE

## 2025-02-07 PROCEDURE — 2580000003 HC RX 258: Performed by: NURSE PRACTITIONER

## 2025-02-07 PROCEDURE — 6360000002 HC RX W HCPCS: Performed by: NURSE PRACTITIONER

## 2025-02-07 PROCEDURE — 94640 AIRWAY INHALATION TREATMENT: CPT

## 2025-02-07 RX ADMIN — SODIUM CHLORIDE, PRESERVATIVE FREE 10 ML: 5 INJECTION INTRAVENOUS at 21:11

## 2025-02-07 RX ADMIN — ALBUTEROL SULFATE 2 PUFF: 90 AEROSOL, METERED RESPIRATORY (INHALATION) at 19:05

## 2025-02-07 RX ADMIN — BUSPIRONE HYDROCHLORIDE 5 MG: 5 TABLET ORAL at 08:29

## 2025-02-07 RX ADMIN — BUSPIRONE HYDROCHLORIDE 5 MG: 5 TABLET ORAL at 21:08

## 2025-02-07 RX ADMIN — VANCOMYCIN 1750 MG: 1.25 INJECTION, SOLUTION INTRAVENOUS at 13:13

## 2025-02-07 RX ADMIN — CEFEPIME 2000 MG: 2 INJECTION, POWDER, FOR SOLUTION INTRAVENOUS at 16:46

## 2025-02-07 RX ADMIN — FUROSEMIDE 40 MG: 40 TABLET ORAL at 08:29

## 2025-02-07 RX ADMIN — NIFEDIPINE 60 MG: 30 TABLET, FILM COATED, EXTENDED RELEASE ORAL at 08:29

## 2025-02-07 RX ADMIN — DOXAZOSIN MESYLATE 2 MG: 2 TABLET ORAL at 21:07

## 2025-02-07 RX ADMIN — DOXAZOSIN MESYLATE 2 MG: 2 TABLET ORAL at 08:29

## 2025-02-07 RX ADMIN — APIXABAN 5 MG: 5 TABLET, FILM COATED ORAL at 08:29

## 2025-02-07 RX ADMIN — VANCOMYCIN 1750 MG: 1.25 INJECTION, SOLUTION INTRAVENOUS at 23:02

## 2025-02-07 RX ADMIN — ATORVASTATIN CALCIUM 40 MG: 40 TABLET, FILM COATED ORAL at 08:30

## 2025-02-07 RX ADMIN — ALBUTEROL SULFATE 2 PUFF: 90 AEROSOL, METERED RESPIRATORY (INHALATION) at 06:10

## 2025-02-07 RX ADMIN — CLOPIDOGREL BISULFATE 75 MG: 75 TABLET ORAL at 08:30

## 2025-02-07 RX ADMIN — LOSARTAN POTASSIUM 100 MG: 100 TABLET, FILM COATED ORAL at 08:29

## 2025-02-07 RX ADMIN — INSULIN LISPRO 1 UNITS: 100 INJECTION, SOLUTION INTRAVENOUS; SUBCUTANEOUS at 08:31

## 2025-02-07 RX ADMIN — SODIUM CHLORIDE, PRESERVATIVE FREE 10 ML: 5 INJECTION INTRAVENOUS at 08:31

## 2025-02-07 RX ADMIN — QUETIAPINE FUMARATE 100 MG: 100 TABLET ORAL at 21:08

## 2025-02-07 RX ADMIN — CEFEPIME 2000 MG: 2 INJECTION, POWDER, FOR SOLUTION INTRAVENOUS at 08:37

## 2025-02-07 RX ADMIN — APIXABAN 5 MG: 5 TABLET, FILM COATED ORAL at 21:08

## 2025-02-07 RX ADMIN — BUPROPION HYDROCHLORIDE 150 MG: 150 TABLET, FILM COATED, EXTENDED RELEASE ORAL at 08:29

## 2025-02-07 RX ADMIN — CARVEDILOL 50 MG: 25 TABLET, FILM COATED ORAL at 08:29

## 2025-02-07 RX ADMIN — CARVEDILOL 50 MG: 25 TABLET, FILM COATED ORAL at 16:47

## 2025-02-07 RX ADMIN — EMPAGLIFLOZIN 10 MG: 10 TABLET, FILM COATED ORAL at 08:30

## 2025-02-07 RX ADMIN — SPIRONOLACTONE 25 MG: 25 TABLET ORAL at 08:29

## 2025-02-07 RX ADMIN — NIFEDIPINE 60 MG: 30 TABLET, FILM COATED, EXTENDED RELEASE ORAL at 21:08

## 2025-02-07 RX ADMIN — BUSPIRONE HYDROCHLORIDE 5 MG: 5 TABLET ORAL at 13:10

## 2025-02-07 NOTE — FLOWSHEET NOTE
02/06/25 1934   Vital Signs   Temp 99 °F (37.2 °C)   Temp Source Oral   Pulse 64   Heart Rate Source Monitor   Respirations 18   BP (!) 128/59   MAP (Calculated) 82   BP Location Right upper arm   BP Method Automatic   Patient Position Up in chair   Oxygen Therapy   SpO2 94 %   O2 Device None (Room air)       Patient A+Ox4, vitals and assessments done at this time. Patient sitting in chair, call light within reach.

## 2025-02-07 NOTE — CARE COORDINATION
Case Management Assessment  Initial Evaluation    Date/Time of Evaluation: 2/7/2025 3:30 PM  Assessment Completed by: Vannesa Strauss RN    If patient is discharged prior to next notation, then this note serves as note for discharge by case management.    Patient Name: Venkata Parker                   YOB: 1975  Diagnosis: Hypoxemia [R09.02]  HCAP (healthcare-associated pneumonia) [J18.9]  Severe sepsis (HCC) [A41.9, R65.20]  Pneumonia of right lung due to infectious organism, unspecified part of lung [J18.9]                   Date / Time: 2/6/2025  8:38 AM    Patient Admission Status: Inpatient   Readmission Risk (Low < 19, Mod (19-27), High > 27): Readmission Risk Score: 23.7    Current PCP: Jakob Jauregui APRN - CNP  PCP verified by CM? Yes ([facility])    Chart Reviewed: Yes      History Provided by: Patient  Patient Orientation: Alert and Oriented    Patient Cognition: Alert    Hospitalization in the last 30 days (Readmission):  Yes    If yes, Readmission Assessment in CM Navigator will be completed.    Advance Directives:      Code Status: Full Code   Patient's Primary Decision Maker is: Legal Next of Kin      Discharge Planning:    Patient lives with: Alone Type of Home: Other (Comment) (Return to rehab)  Primary Care Giver: Self  Patient Support Systems include: Other (Comment) ([facility] wellness staff)   Current Financial resources: Other (Comment) ([facility])  Current community resources: [facility] ([facility] ([facility]))  Current services prior to admission: Other (Comment) (rehab)            Current DME:              Type of Home Care services:  None    ADLS  Prior functional level: Independent in ADLs/IADLs  Current functional level: Independent in ADLs/IADLs    PT AM-PAC:   /24  OT AM-PAC:   /24    Family can provide assistance at DC: No  Would you like Case Management to discuss the discharge plan with any other family members/significant others, and if so, who?

## 2025-02-07 NOTE — DISCHARGE INSTRUCTIONS
Heart Failure Resources:  Heart Failure Interactive Workbook:  Go to https://RazzitalGoowy.Cleeng/[email or web address] for a Free Heart Failure Interactive Workbook provided by The [employer]. This interactive workbook will provide information on Healthier Living with Heart Failure. Please copy and paste link into search bar. Use your mouse to scroll through the pages.    HF [place] percy:   Heart Failure Free smart phone percy available for iPhone and Android download. Use your phone to track sodium intake, fluid intake, symptoms, and weight.     Low Sodium Diet / Recipes:  Go to www.[employer], [place]" website for “renal” diet which is Low Sodium! [employer] is a dialysis company, but this website offers free seasonal cookbooks. Each quarter, they will release 25-30 new recipes with a breakdown of calories, sodium, and glucose. You can also go to wwwOneWire/recipes website for free recipes.     Discharge Instruction Video:  Scan the QR code below with your camera and click the canva.com link to open the video and watch educational information on Heart Failure and Medications from one of our nurses.   https://www.Pheedo/design/DAFZnsH_JRk/9BemzqhAZNIirPLuvV8ixo/edit    Home Exercise Program:   Identification of Green/Yellow/Red zones:  You should be able to identify when you feel good (green zone), if you have 1-2 symptoms of HF (yellow zone), or if you are in need of medical attention (red zone).  In your CHF education folder you were provided a “stop light tool” to outline this information.     We want to you to rate your exertion levels:    Our therapy team has discussed means of identification with you such as the \"Smita scale.\"  The Smita rating scale ranges from 6 to 20, where 6 means \"no exertion at all\" and 20 means \"maximal exertion.\" The goal is to use this to gauge how much effort it is taking for you to do your normal daily tasks.   You should be able to recognize when too much exertion is

## 2025-02-08 LAB
ANION GAP SERPL CALCULATED.3IONS-SCNC: 9 MMOL/L (ref 3–16)
BASOPHILS # BLD: 0 K/UL (ref 0–0.2)
BASOPHILS NFR BLD: 0.4 %
BUN SERPL-MCNC: 15 MG/DL (ref 7–20)
CALCIUM SERPL-MCNC: 8.6 MG/DL (ref 8.3–10.6)
CHLORIDE SERPL-SCNC: 103 MMOL/L (ref 99–110)
CO2 SERPL-SCNC: 23 MMOL/L (ref 21–32)
CREAT SERPL-MCNC: 0.7 MG/DL (ref 0.9–1.3)
DEPRECATED RDW RBC AUTO: 16.5 % (ref 12.4–15.4)
EOSINOPHIL # BLD: 0.2 K/UL (ref 0–0.6)
EOSINOPHIL NFR BLD: 2.8 %
GFR SERPLBLD CREATININE-BSD FMLA CKD-EPI: >90 ML/MIN/{1.73_M2}
GLUCOSE BLD-MCNC: 144 MG/DL (ref 70–99)
GLUCOSE BLD-MCNC: 145 MG/DL (ref 70–99)
GLUCOSE BLD-MCNC: 276 MG/DL (ref 70–99)
GLUCOSE SERPL-MCNC: 179 MG/DL (ref 70–99)
HCT VFR BLD AUTO: 40.4 % (ref 40.5–52.5)
HGB BLD-MCNC: 13.4 G/DL (ref 13.5–17.5)
LYMPHOCYTES # BLD: 0.7 K/UL (ref 1–5.1)
LYMPHOCYTES NFR BLD: 8.7 %
MCH RBC QN AUTO: 26.5 PG (ref 26–34)
MCHC RBC AUTO-ENTMCNC: 33.2 G/DL (ref 31–36)
MCV RBC AUTO: 79.8 FL (ref 80–100)
MONOCYTES # BLD: 0.8 K/UL (ref 0–1.3)
MONOCYTES NFR BLD: 8.7 %
NEUTROPHILS # BLD: 6.9 K/UL (ref 1.7–7.7)
NEUTROPHILS NFR BLD: 79.4 %
PERFORMED ON: ABNORMAL
PLATELET # BLD AUTO: 123 K/UL (ref 135–450)
PMV BLD AUTO: 7.7 FL (ref 5–10.5)
POTASSIUM SERPL-SCNC: 4.1 MMOL/L (ref 3.5–5.1)
RBC # BLD AUTO: 5.07 M/UL (ref 4.2–5.9)
SODIUM SERPL-SCNC: 135 MMOL/L (ref 136–145)
VANCOMYCIN TROUGH SERPL-MCNC: 13.1 UG/ML (ref 10–20)
WBC # BLD AUTO: 8.7 K/UL (ref 4–11)

## 2025-02-08 PROCEDURE — 6370000000 HC RX 637 (ALT 250 FOR IP): Performed by: NURSE PRACTITIONER

## 2025-02-08 PROCEDURE — 36415 COLL VENOUS BLD VENIPUNCTURE: CPT

## 2025-02-08 PROCEDURE — 6360000002 HC RX W HCPCS: Performed by: INTERNAL MEDICINE

## 2025-02-08 PROCEDURE — 6360000002 HC RX W HCPCS: Performed by: NURSE PRACTITIONER

## 2025-02-08 PROCEDURE — 80202 ASSAY OF VANCOMYCIN: CPT

## 2025-02-08 PROCEDURE — 80048 BASIC METABOLIC PNL TOTAL CA: CPT

## 2025-02-08 PROCEDURE — 99232 SBSQ HOSP IP/OBS MODERATE 35: CPT | Performed by: INTERNAL MEDICINE

## 2025-02-08 PROCEDURE — 85025 COMPLETE CBC W/AUTO DIFF WBC: CPT

## 2025-02-08 PROCEDURE — 1200000000 HC SEMI PRIVATE

## 2025-02-08 PROCEDURE — 2580000003 HC RX 258: Performed by: INTERNAL MEDICINE

## 2025-02-08 PROCEDURE — 94761 N-INVAS EAR/PLS OXIMETRY MLT: CPT

## 2025-02-08 PROCEDURE — 6370000000 HC RX 637 (ALT 250 FOR IP): Performed by: INTERNAL MEDICINE

## 2025-02-08 PROCEDURE — 2500000003 HC RX 250 WO HCPCS: Performed by: NURSE PRACTITIONER

## 2025-02-08 RX ADMIN — SPIRONOLACTONE 25 MG: 25 TABLET ORAL at 09:28

## 2025-02-08 RX ADMIN — CARVEDILOL 50 MG: 25 TABLET, FILM COATED ORAL at 09:28

## 2025-02-08 RX ADMIN — CEFEPIME 2000 MG: 2 INJECTION, POWDER, FOR SOLUTION INTRAVENOUS at 00:38

## 2025-02-08 RX ADMIN — CLOPIDOGREL BISULFATE 75 MG: 75 TABLET ORAL at 09:27

## 2025-02-08 RX ADMIN — SODIUM CHLORIDE, PRESERVATIVE FREE 10 ML: 5 INJECTION INTRAVENOUS at 09:31

## 2025-02-08 RX ADMIN — QUETIAPINE FUMARATE 100 MG: 100 TABLET ORAL at 21:09

## 2025-02-08 RX ADMIN — TIZANIDINE 4 MG: 4 TABLET ORAL at 09:31

## 2025-02-08 RX ADMIN — DOXAZOSIN MESYLATE 2 MG: 2 TABLET ORAL at 21:09

## 2025-02-08 RX ADMIN — FUROSEMIDE 40 MG: 40 TABLET ORAL at 09:31

## 2025-02-08 RX ADMIN — ATORVASTATIN CALCIUM 40 MG: 40 TABLET, FILM COATED ORAL at 09:27

## 2025-02-08 RX ADMIN — NIFEDIPINE 60 MG: 30 TABLET, FILM COATED, EXTENDED RELEASE ORAL at 09:28

## 2025-02-08 RX ADMIN — APIXABAN 5 MG: 5 TABLET, FILM COATED ORAL at 21:09

## 2025-02-08 RX ADMIN — BUSPIRONE HYDROCHLORIDE 5 MG: 5 TABLET ORAL at 12:15

## 2025-02-08 RX ADMIN — SODIUM CHLORIDE, PRESERVATIVE FREE 10 ML: 5 INJECTION INTRAVENOUS at 21:09

## 2025-02-08 RX ADMIN — NIFEDIPINE 60 MG: 30 TABLET, FILM COATED, EXTENDED RELEASE ORAL at 21:09

## 2025-02-08 RX ADMIN — LOSARTAN POTASSIUM 100 MG: 100 TABLET, FILM COATED ORAL at 09:27

## 2025-02-08 RX ADMIN — VANCOMYCIN 1750 MG: 1.25 INJECTION, SOLUTION INTRAVENOUS at 12:15

## 2025-02-08 RX ADMIN — BUSPIRONE HYDROCHLORIDE 5 MG: 5 TABLET ORAL at 21:09

## 2025-02-08 RX ADMIN — CEFEPIME 2000 MG: 2 INJECTION, POWDER, FOR SOLUTION INTRAVENOUS at 16:25

## 2025-02-08 RX ADMIN — APIXABAN 5 MG: 5 TABLET, FILM COATED ORAL at 09:27

## 2025-02-08 RX ADMIN — BUPROPION HYDROCHLORIDE 150 MG: 150 TABLET, FILM COATED, EXTENDED RELEASE ORAL at 09:27

## 2025-02-08 RX ADMIN — CEFEPIME 2000 MG: 2 INJECTION, POWDER, FOR SOLUTION INTRAVENOUS at 09:32

## 2025-02-08 RX ADMIN — BUSPIRONE HYDROCHLORIDE 5 MG: 5 TABLET ORAL at 09:27

## 2025-02-08 RX ADMIN — DOXAZOSIN MESYLATE 2 MG: 2 TABLET ORAL at 09:28

## 2025-02-08 RX ADMIN — TIZANIDINE 4 MG: 4 TABLET ORAL at 21:09

## 2025-02-08 RX ADMIN — EMPAGLIFLOZIN 10 MG: 10 TABLET, FILM COATED ORAL at 09:27

## 2025-02-08 RX ADMIN — VANCOMYCIN 1750 MG: 1.25 INJECTION, SOLUTION INTRAVENOUS at 23:12

## 2025-02-08 NOTE — FLOWSHEET NOTE
02/07/25 1915   Vital Signs   Temp 97.5 °F (36.4 °C)   Temp Source Oral   Pulse 66   Heart Rate Source Monitor   Respirations 20   /62   MAP (Calculated) 83   BP Location Right upper arm   BP Method Automatic   Oxygen Therapy   SpO2 94 %   O2 Device None (Room air)     Shift assessment complete.  Patient is alert and oriented.  Vital signs are stable.  Respirations are even and easy, no signs or symptoms of distress.  Pt denies any needs at this time.  Call light within reach.

## 2025-02-09 LAB
GLUCOSE BLD-MCNC: 135 MG/DL (ref 70–99)
GLUCOSE BLD-MCNC: 136 MG/DL (ref 70–99)
GLUCOSE BLD-MCNC: 185 MG/DL (ref 70–99)
GLUCOSE BLD-MCNC: 394 MG/DL (ref 70–99)
PERFORMED ON: ABNORMAL

## 2025-02-09 PROCEDURE — 6360000002 HC RX W HCPCS: Performed by: INTERNAL MEDICINE

## 2025-02-09 PROCEDURE — 6370000000 HC RX 637 (ALT 250 FOR IP): Performed by: NURSE PRACTITIONER

## 2025-02-09 PROCEDURE — 1200000000 HC SEMI PRIVATE

## 2025-02-09 PROCEDURE — 6360000002 HC RX W HCPCS: Performed by: NURSE PRACTITIONER

## 2025-02-09 PROCEDURE — 94761 N-INVAS EAR/PLS OXIMETRY MLT: CPT

## 2025-02-09 PROCEDURE — 2580000003 HC RX 258: Performed by: INTERNAL MEDICINE

## 2025-02-09 PROCEDURE — 6370000000 HC RX 637 (ALT 250 FOR IP): Performed by: INTERNAL MEDICINE

## 2025-02-09 PROCEDURE — 2500000003 HC RX 250 WO HCPCS: Performed by: NURSE PRACTITIONER

## 2025-02-09 PROCEDURE — 99232 SBSQ HOSP IP/OBS MODERATE 35: CPT | Performed by: INTERNAL MEDICINE

## 2025-02-09 RX ORDER — LEVOFLOXACIN 750 MG/1
750 TABLET, FILM COATED ORAL DAILY
Qty: 4 TABLET | Refills: 0 | Status: SHIPPED | OUTPATIENT
Start: 2025-02-09 | End: 2025-02-13

## 2025-02-09 RX ADMIN — SPIRONOLACTONE 25 MG: 25 TABLET ORAL at 08:23

## 2025-02-09 RX ADMIN — VANCOMYCIN 1750 MG: 1.25 INJECTION, SOLUTION INTRAVENOUS at 22:57

## 2025-02-09 RX ADMIN — APIXABAN 5 MG: 5 TABLET, FILM COATED ORAL at 08:24

## 2025-02-09 RX ADMIN — CEFEPIME 2000 MG: 2 INJECTION, POWDER, FOR SOLUTION INTRAVENOUS at 08:29

## 2025-02-09 RX ADMIN — SODIUM CHLORIDE, PRESERVATIVE FREE 10 ML: 5 INJECTION INTRAVENOUS at 08:24

## 2025-02-09 RX ADMIN — CEFEPIME 2000 MG: 2 INJECTION, POWDER, FOR SOLUTION INTRAVENOUS at 01:29

## 2025-02-09 RX ADMIN — FUROSEMIDE 40 MG: 40 TABLET ORAL at 08:23

## 2025-02-09 RX ADMIN — BUSPIRONE HYDROCHLORIDE 5 MG: 5 TABLET ORAL at 12:43

## 2025-02-09 RX ADMIN — NIFEDIPINE 60 MG: 30 TABLET, FILM COATED, EXTENDED RELEASE ORAL at 08:23

## 2025-02-09 RX ADMIN — CEFEPIME 2000 MG: 2 INJECTION, POWDER, FOR SOLUTION INTRAVENOUS at 16:42

## 2025-02-09 RX ADMIN — ATORVASTATIN CALCIUM 40 MG: 40 TABLET, FILM COATED ORAL at 08:24

## 2025-02-09 RX ADMIN — CARVEDILOL 50 MG: 25 TABLET, FILM COATED ORAL at 16:45

## 2025-02-09 RX ADMIN — CARVEDILOL 50 MG: 25 TABLET, FILM COATED ORAL at 08:23

## 2025-02-09 RX ADMIN — APIXABAN 5 MG: 5 TABLET, FILM COATED ORAL at 21:04

## 2025-02-09 RX ADMIN — DOXAZOSIN MESYLATE 2 MG: 2 TABLET ORAL at 21:04

## 2025-02-09 RX ADMIN — TIZANIDINE 4 MG: 4 TABLET ORAL at 05:38

## 2025-02-09 RX ADMIN — BUSPIRONE HYDROCHLORIDE 5 MG: 5 TABLET ORAL at 21:04

## 2025-02-09 RX ADMIN — TIZANIDINE 4 MG: 4 TABLET ORAL at 21:04

## 2025-02-09 RX ADMIN — BUPROPION HYDROCHLORIDE 150 MG: 150 TABLET, FILM COATED, EXTENDED RELEASE ORAL at 08:23

## 2025-02-09 RX ADMIN — NIFEDIPINE 60 MG: 30 TABLET, FILM COATED, EXTENDED RELEASE ORAL at 21:03

## 2025-02-09 RX ADMIN — BUSPIRONE HYDROCHLORIDE 5 MG: 5 TABLET ORAL at 08:23

## 2025-02-09 RX ADMIN — CLOPIDOGREL BISULFATE 75 MG: 75 TABLET ORAL at 08:24

## 2025-02-09 RX ADMIN — VANCOMYCIN 1750 MG: 1.25 INJECTION, SOLUTION INTRAVENOUS at 12:40

## 2025-02-09 RX ADMIN — DOXAZOSIN MESYLATE 2 MG: 2 TABLET ORAL at 08:23

## 2025-02-09 RX ADMIN — LOSARTAN POTASSIUM 100 MG: 100 TABLET, FILM COATED ORAL at 08:23

## 2025-02-09 RX ADMIN — EMPAGLIFLOZIN 10 MG: 10 TABLET, FILM COATED ORAL at 08:24

## 2025-02-09 RX ADMIN — QUETIAPINE FUMARATE 100 MG: 100 TABLET ORAL at 21:04

## 2025-02-09 ASSESSMENT — PAIN DESCRIPTION - ORIENTATION: ORIENTATION: LOWER

## 2025-02-09 ASSESSMENT — PAIN - FUNCTIONAL ASSESSMENT: PAIN_FUNCTIONAL_ASSESSMENT: ACTIVITIES ARE NOT PREVENTED

## 2025-02-09 ASSESSMENT — PAIN DESCRIPTION - PAIN TYPE: TYPE: CHRONIC PAIN

## 2025-02-09 ASSESSMENT — PAIN DESCRIPTION - LOCATION: LOCATION: BACK

## 2025-02-09 ASSESSMENT — PAIN SCALES - GENERAL
PAINLEVEL_OUTOF10: 10
PAINLEVEL_OUTOF10: 5

## 2025-02-09 ASSESSMENT — PAIN DESCRIPTION - DESCRIPTORS: DESCRIPTORS: ACHING;CRAMPING;DISCOMFORT

## 2025-02-09 NOTE — FLOWSHEET NOTE
02/08/25 1903   Vital Signs   Temp 98.2 °F (36.8 °C)   Temp Source Oral   Pulse 54   Heart Rate Source Monitor   Respirations 17   BP (!) 112/90   MAP (Calculated) 97   BP Location Right upper arm   BP Method Automatic   Patient Position Sitting;Up in chair   Oxygen Therapy   SpO2 96 %   O2 Device None (Room air)     Assessment complete and evening medications administered. Patient is alert and oriented.    Bedside Mobility Assessment Tool (BMAT):     Assessment Level 1- Sit and Shake    1. From a semi-reclined position, ask patient to sit up and rotate to a seated position at the side of the bed. Can use the bedrail.    2. Ask patient to reach out and grab your hand and shake making sure patient reaches across his/her midline.   Pass- Patient is able to come to a seated position, maintain core strength. Maintains seated balance while reaching across midline. Move on to Assessment Level 2.     Assessment Level 2- Stretch and Point   1. With patient in seated position at the side of the bed, have patient place both feet on the floor (or stool) with knees no higher than hips.    2. Ask patient to stretch one leg and straighten the knee, then bend the ankle/flex and point the toes. If appropriate, repeat with the other leg.   Pass- Patient is able to demonstrate appropriate quad strength on intended weight bearing limb(s). Move onto Assessment Level 3.     Assessment Level 3- Stand   1. Ask patient to elevate off the bed or chair (seated to standing) using an assistive device (cane, bedrail).    2. Patient should be able to raise buttocks off be and hold for a count of five. May repeat once.   Pass- Patient maintains standing stability for at least 5 seconds, proceed to assessment level 4.    Assessment Level 4- Walk   1. Ask patient to march in place at bedside.    2. Then ask patient to advance step and return each foot. Some medical conditions may render a patient from stepping backwards, use your best clinical  No MVC

## 2025-02-09 NOTE — CARE COORDINATION
Incoming call from  stating pt is going to DC today back to the [facility]. Per CM note, we are to call [facility] at [phone] for transport back to the [facility]. Had to  for return call. Left numbers for 2W and CM,     Pt to DC to [facility] today. Pt is in agreement. Transport to be provided by the center. Awaiting call from [facility] with time of . No additional DC or DME needs identified.     IMM na    O2 91% RA    1313: another call to the [facility]. The number was provided by the center last week in case of a weekend DC. Still have not received a return call from [facility]. Called [phone]

## 2025-02-10 VITALS
SYSTOLIC BLOOD PRESSURE: 133 MMHG | DIASTOLIC BLOOD PRESSURE: 84 MMHG | TEMPERATURE: 97.4 F | WEIGHT: 315 LBS | RESPIRATION RATE: 17 BRPM | HEIGHT: 72 IN | BODY MASS INDEX: 42.66 KG/M2 | OXYGEN SATURATION: 92 % | HEART RATE: 57 BPM

## 2025-02-10 PROBLEM — R09.02 HYPOXEMIA: Status: ACTIVE | Noted: 2025-02-10

## 2025-02-10 LAB
BACTERIA BLD CULT ORG #2: NORMAL
BACTERIA BLD CULT: NORMAL

## 2025-02-10 PROCEDURE — 99238 HOSP IP/OBS DSCHRG MGMT 30/<: CPT | Performed by: INTERNAL MEDICINE

## 2025-02-10 PROCEDURE — 94761 N-INVAS EAR/PLS OXIMETRY MLT: CPT

## 2025-02-10 PROCEDURE — 6370000000 HC RX 637 (ALT 250 FOR IP): Performed by: NURSE PRACTITIONER

## 2025-02-10 RX ADMIN — FUROSEMIDE 40 MG: 40 TABLET ORAL at 08:53

## 2025-02-10 RX ADMIN — BUPROPION HYDROCHLORIDE 150 MG: 150 TABLET, FILM COATED, EXTENDED RELEASE ORAL at 09:17

## 2025-02-10 RX ADMIN — ATORVASTATIN CALCIUM 40 MG: 40 TABLET, FILM COATED ORAL at 08:53

## 2025-02-10 RX ADMIN — SPIRONOLACTONE 25 MG: 25 TABLET ORAL at 08:53

## 2025-02-10 RX ADMIN — BUSPIRONE HYDROCHLORIDE 5 MG: 5 TABLET ORAL at 08:54

## 2025-02-10 RX ADMIN — NIFEDIPINE 60 MG: 30 TABLET, FILM COATED, EXTENDED RELEASE ORAL at 08:54

## 2025-02-10 RX ADMIN — CARVEDILOL 50 MG: 25 TABLET, FILM COATED ORAL at 08:53

## 2025-02-10 RX ADMIN — DOXAZOSIN MESYLATE 2 MG: 2 TABLET ORAL at 08:53

## 2025-02-10 RX ADMIN — CLOPIDOGREL BISULFATE 75 MG: 75 TABLET ORAL at 08:54

## 2025-02-10 RX ADMIN — EMPAGLIFLOZIN 10 MG: 10 TABLET, FILM COATED ORAL at 08:54

## 2025-02-10 RX ADMIN — LOSARTAN POTASSIUM 100 MG: 100 TABLET, FILM COATED ORAL at 08:53

## 2025-02-10 RX ADMIN — APIXABAN 5 MG: 5 TABLET, FILM COATED ORAL at 08:54

## 2025-02-10 NOTE — PLAN OF CARE
Problem: Safety - Adult  Goal: Free from fall injury  2/6/2025 1728 by Presley Yip RN  Outcome: Progressing  2/6/2025 1728 by Presley Yip RN  Outcome: Progressing     Problem: Chronic Conditions and Co-morbidities  Goal: Patient's chronic conditions and co-morbidity symptoms are monitored and maintained or improved  2/6/2025 1728 by Presley Yip RN  Outcome: Progressing  2/6/2025 1728 by Presley Yip RN  Outcome: Progressing     Problem: Discharge Planning  Goal: Discharge to home or other facility with appropriate resources  2/6/2025 1728 by Presley Yip RN  Outcome: Progressing  2/6/2025 1728 by Presley Yip RN  Outcome: Progressing     
  Problem: Safety - Adult  Goal: Free from fall injury  2/7/2025 2305 by Richard Burk RN  Outcome: Progressing  2/7/2025 1029 by Presley Yip RN  Outcome: Progressing     Problem: Chronic Conditions and Co-morbidities  Goal: Patient's chronic conditions and co-morbidity symptoms are monitored and maintained or improved  2/7/2025 2305 by Richard Burk RN  Outcome: Progressing  2/7/2025 1029 by Presley Yip RN  Outcome: Progressing     Problem: Discharge Planning  Goal: Discharge to home or other facility with appropriate resources  2/7/2025 2305 by Richard Burk RN  Outcome: Progressing  2/7/2025 1029 by Presley Yip RN  Outcome: Progressing     
  Problem: Safety - Adult  Goal: Free from fall injury  2/8/2025 1302 by Presley Yip RN  Outcome: Progressing  2/7/2025 2305 by Richard Burk RN  Outcome: Progressing     Problem: Chronic Conditions and Co-morbidities  Goal: Patient's chronic conditions and co-morbidity symptoms are monitored and maintained or improved  2/8/2025 1302 by Presley Yip RN  Outcome: Progressing  2/7/2025 2305 by Richard Burk RN  Outcome: Progressing     Problem: Discharge Planning  Goal: Discharge to home or other facility with appropriate resources  2/8/2025 1302 by Presley Yip RN  Outcome: Progressing  2/7/2025 2305 by Richard Burk RN  Outcome: Progressing     
  Problem: Safety - Adult  Goal: Free from fall injury  2/8/2025 2323 by Ngoc Link RN  Outcome: Progressing  2/8/2025 1302 by Presley Yip RN  Outcome: Progressing     Problem: Chronic Conditions and Co-morbidities  Goal: Patient's chronic conditions and co-morbidity symptoms are monitored and maintained or improved  2/8/2025 2323 by Ngoc Link RN  Outcome: Progressing  2/8/2025 1302 by Presley Yip RN  Outcome: Progressing     Problem: Discharge Planning  Goal: Discharge to home or other facility with appropriate resources  2/8/2025 2323 by Ngoc Link RN  Outcome: Progressing  2/8/2025 1302 by Presley Yip RN  Outcome: Progressing     
  Problem: Safety - Adult  Goal: Free from fall injury  2/9/2025 2226 by Nena Naidu, RN  Outcome: Progressing  2/9/2025 1149 by Presley Yip, RN  Outcome: Progressing     Problem: Pain  Goal: Verbalizes/displays adequate comfort level or baseline comfort level  Outcome: Progressing     
  Problem: Safety - Adult  Goal: Free from fall injury  Outcome: Progressing     Problem: Chronic Conditions and Co-morbidities  Goal: Patient's chronic conditions and co-morbidity symptoms are monitored and maintained or improved  Outcome: Progressing     Problem: Discharge Planning  Goal: Discharge to home or other facility with appropriate resources  Outcome: Progressing     
  Problem: Safety - Adult  Goal: Free from fall injury  Outcome: Progressing     Problem: Chronic Conditions and Co-morbidities  Goal: Patient's chronic conditions and co-morbidity symptoms are monitored and maintained or improved  Outcome: Progressing     Problem: Discharge Planning  Goal: Discharge to home or other facility with appropriate resources  Outcome: Progressing     
Admit 2w    HCAP  Sepsis  Hx of CHF- did not continue IVF  Vancomycin and Cefepime      Marcela Billingsley, APRN - CNP   
SLP completed evaluation. Please refer to notes in EMR.    Jenny Martinez MA CF-SLP   Speech Language Pathologist     
Initiate consult with , Psychosocial CNS, Spiritual Care as appropriate  Outcome: Progressing     Problem: Respiratory - Adult  Goal: Achieves optimal ventilation and oxygenation  Outcome: Progressing     Problem: Skin/Tissue Integrity - Adult  Goal: Skin integrity remains intact  Outcome: Progressing     Problem: Metabolic/Fluid and Electrolytes - Adult  Goal: Electrolytes maintained within normal limits  Outcome: Progressing

## 2025-02-10 NOTE — DISCHARGE SUMMARY
Name:  Venkata Parker  Room:  0202/0202-01  MRN:    [id number]    Discharge Summary      This discharge summary is in conjunction with a complete physical exam done on the day of discharge.      Discharging Physician: RACIEL ALEJANDRE MD      Admit: 2/6/2025  Discharge:  2/10/2025     Diagnoses this Admission    Principal Problem:    Pneumonia of right lung due to infectious organism  Active Problems:    Cerebrovascular accident (CVA) (HCC)    Congestive heart failure (CHF) (HCC)    Mixed hyperlipidemia    Primary hypertension    Weakness    Coronary artery disease due to lipid rich plaque    Hypoxemia  Resolved Problems:    * No resolved hospital problems. *      Procedures (Please Review Full Report for Details)      Consults    PHARMACY TO DOSE VANCOMYCIN  IP CONSULT TO HOSPITALIST  PHARMACY TO DOSE VANCOMYCIN      HPI:        Physical Exam at Discharge:  /84   Pulse 57   Temp 97.4 °F (36.3 °C) (Oral)   Resp 17   Ht 1.829 m (6')   Wt (!) 150.5 kg (331 lb 12.8 oz)   SpO2 92%   BMI 45.00 kg/m²         Hospital Course      CBC:   Recent Labs     02/08/25  0945   WBC 8.7   HGB 13.4*   HCT 40.4*   MCV 79.8*   *     BMP:   Recent Labs     02/08/25  0945   *   K 4.1      CO2 23   BUN 15   CREATININE 0.7*     Results       Procedure Component Value Units Date/Time    Culture, Respiratory [[id number]]     Order Status: Sent Specimen: Sputum Expectorated     Legionella antigen, urine [[id number]]     Order Status: Sent Specimen: Urine     Strep Pneumoniae Antigen [[id number]]     Order Status: Sent Specimen: Urine, clean catch     Pneumonia Panel, Molecular [[id number]]     Order Status: Sent Specimen: Sputum Expectorated     MRSA DNA Probe, Nasal [[id number]]     Order Status: Sent Specimen: Nares     Blood Culture 2 [[id number]] Collected: 02/06/25 1048    Order Status: Completed Specimen: Blood Updated: 02/07/25 1615     Culture, Blood 2 No Growth to date.  Any change in status will

## 2025-02-10 NOTE — PROGRESS NOTES
02/06/25 1730   RT Protocol   History Pulmonary Disease 2   Respiratory pattern 0   Breath sounds 2   Cough 0   Indications for Bronchodilator Therapy Decreased or absent breath sounds;Wheezing associated with pulm disorder   Bronchodilator Assessment Score 4       
   02/09/25 1900   RT Protocol   History Pulmonary Disease 2   Respiratory pattern 0   Breath sounds 2   Cough 0   Indications for Bronchodilator Therapy Decreased or absent breath sounds   Bronchodilator Assessment Score 4     RT Inhaler-Nebulizer Bronchodilator Protocol Note    There is a bronchodilator order in the chart from a provider indicating to follow the RT Bronchodilator Protocol and there is an “Initiate RT Inhaler-Nebulizer Bronchodilator Protocol” order as well (see protocol at bottom of note).    CXR Findings:  No results found.    The findings from the last RT Protocol Assessment were as follows:   History Pulmonary Disease: Chronic pulmonary disease  Respiratory Pattern: Regular pattern and RR 12-20 bpm  Breath Sounds: Slightly diminished and/or crackles  Cough: Strong, spontaneous, non-productive  Indication for Bronchodilator Therapy: Decreased or absent breath sounds  Bronchodilator Assessment Score: 4    Aerosolized bronchodilator medication orders have been revised according to the RT Inhaler-Nebulizer Bronchodilator Protocol below.    Respiratory Therapist to perform RT Therapy Protocol Assessment initially then follow the protocol.  Repeat RT Therapy Protocol Assessment PRN for score 0-3 or on second treatment, BID, and PRN for scores above 3.    No Indications - adjust the frequency to every 6 hours PRN wheezing or bronchospasm, if no treatments needed after 48 hours then discontinue using Per Protocol order mode.     If indication present, adjust the RT bronchodilator orders based on the Bronchodilator Assessment Score as indicated below.  Use Inhaler orders unless patient has one or more of the following: on home nebulizer, not able to hold breath for 10 seconds, is not alert and oriented, cannot activate and use MDI correctly, or respiratory rate 25 breaths per minute or more, then use the equivalent nebulizer order(s) with same Frequency and PRN reasons based on the score.  If a patient 
 informed RN that the patient refused lab draw this evening.  Pt also refused clonidine at [facility].  
4 Eyes Skin Assessment     NAME:  Venkata Parker  YOB: 1975  MEDICAL RECORD NUMBER:  [id number]  Patient has redness and excoriation to elmer area, discoloration to bilateral lower extremities old scarring throughout body. Bruising noted to bilateral arms. Weakness noted to bilateral legs  The patient is being assessed for  Admission    I agree that at least one RN has performed a thorough Head to Toe Skin Assessment on the patient. ALL assessment sites listed below have been assessed.      Areas assessed by both nurses:    Head, Face, Ears, Shoulders, Back, Chest, Arms, Elbows, Hands, Sacrum. Buttock, Coccyx, Ischium, and Legs. Feet and Heels        Does the Patient have a Wound? No noted wound(s)       Jermain Prevention initiated by RN: No  Wound Care Orders initiated by RN: No    Pressure Injury (Stage 3,4, Unstageable, DTI, NWPT, and Complex wounds) if present, place Wound referral order by RN under : No    New Ostomies, if present place, Ostomy referral order under : No     Nurse 1 eSignature: Electronically signed by Presley Yip RN on 2/6/25 at 7:30 PM EST    **SHARE this note so that the co-signing nurse can place an eSignature**    Nurse 2 eSignature: {Esignature:[id number]}   
Admit: 2025    Name:  Venkata Parker  Room:  [address] [place] [address]  MRN:    [id number]    Daily Progress Note for 2025     HCAP    Interval History:     Feels about the same  On RA    C/o left low back pain     Scheduled Meds:   cefepime  2,000 mg IntraVENous Q8H    apixaban  5 mg Oral BID    atorvastatin  40 mg Oral Daily    buPROPion  150 mg Oral Daily    busPIRone  5 mg Oral TID    carvedilol  50 mg Oral BID WC    cloNIDine  0.1 mg Oral q8h    clopidogrel  75 mg Oral Daily    doxazosin  2 mg Oral 2 times per day    empagliflozin  10 mg Oral Daily    furosemide  40 mg Oral BID WC    losartan  100 mg Oral Daily    NIFEdipine  60 mg Oral BID    QUEtiapine  100 mg Oral Nightly    spironolactone  25 mg Oral Daily    sodium chloride flush  5-40 mL IntraVENous 2 times per day    insulin lispro  0-4 Units SubCUTAneous 4x Daily AC & HS    vancomycin  1,750 mg IntraVENous Q12H    albuterol sulfate HFA  2 puff Inhalation BID RT       Continuous Infusions:   dextrose      sodium chloride         PRN Meds:  albuterol sulfate HFA, glucose, dextrose bolus **OR** dextrose bolus, glucagon (rDNA), dextrose, sodium chloride flush, sodium chloride, ondansetron **OR** ondansetron, polyethylene glycol, acetaminophen **OR** acetaminophen                  Objective:     Temp  Av.3 °F (36.8 °C)  Min: 97.5 °F (36.4 °C)  Max: 99.1 °F (37.3 °C)  Pulse  Av.4  Min: 62  Max: 80  BP  Min: 118/51  Max: 142/49  SpO2  Av.8 %  Min: 90 %  Max: 95 %  No data found.        Intake/Output Summary (Last 24 hours) at 2025 0717  Last data filed at 2025 0326  Gross per 24 hour   Intake 222 ml   Output 4000 ml   Net -3778 ml       Physical Exam:    Gen: No distress. Alert. Obese.  Eyes: PERRL. No sclera icterus. No conjunctival injection. Decreased visual acuity of left peripheral vision.  ENT: No discharge. Pharynx clear.   Neck: No JVD.  No Carotid Bruit. Trachea midline.  Resp: On 2L O2 via NC. Wet-sounding cough. No accessory muscle 
Admit: 2025    Name:  Venkata Parker  Room:  [address] [place] [address]  MRN:    [id number]    Daily Progress Note for 2025     HCAP    Interval History:     Feels good   On RA    Scheduled Meds:   cefepime  2,000 mg IntraVENous Q8H    apixaban  5 mg Oral BID    atorvastatin  40 mg Oral Daily    buPROPion  150 mg Oral Daily    busPIRone  5 mg Oral TID    carvedilol  50 mg Oral BID WC    cloNIDine  0.1 mg Oral q8h    clopidogrel  75 mg Oral Daily    doxazosin  2 mg Oral 2 times per day    empagliflozin  10 mg Oral Daily    furosemide  40 mg Oral BID WC    losartan  100 mg Oral Daily    NIFEdipine  60 mg Oral BID    QUEtiapine  100 mg Oral Nightly    spironolactone  25 mg Oral Daily    sodium chloride flush  5-40 mL IntraVENous 2 times per day    insulin lispro  0-4 Units SubCUTAneous 4x Daily AC & HS    vancomycin  1,750 mg IntraVENous Q12H    albuterol sulfate HFA  2 puff Inhalation BID RT       Continuous Infusions:   dextrose      sodium chloride         PRN Meds:  tiZANidine, albuterol sulfate HFA, glucose, dextrose bolus **OR** dextrose bolus, glucagon (rDNA), dextrose, sodium chloride flush, sodium chloride, ondansetron **OR** ondansetron, polyethylene glycol, acetaminophen **OR** acetaminophen                  Objective:     Temp  Av.2 °F (36.8 °C)  Min: 98.2 °F (36.8 °C)  Max: 98.4 °F (36.9 °C)  Pulse  Av.6  Min: 51  Max: 66  BP  Min: 112/90  Max: 167/82  SpO2  Av.8 %  Min: 91 %  Max: 96 %  Patient Vitals for the past 4 hrs:   BP Temp Temp src Pulse Resp SpO2   25 0536 (!) 167/82 98.2 °F (36.8 °C) -- 60 18 --   25 0507 125/60 98.2 °F (36.8 °C) Oral 51 18 91 %           Intake/Output Summary (Last 24 hours) at 2025 0725  Last data filed at 2025 0536  Gross per 24 hour   Intake 910 ml   Output 4125 ml   Net -3215 ml       Physical Exam:    Gen: No distress. Alert. Obese.  Eyes: PERRL. No sclera icterus. No conjunctival injection. Decreased visual acuity of left peripheral 
Attempted to visit with Pt for loneliness/social isolation screen. Pt asleep. Left Spiritual Health note, informing him of our continued availability for support.    Marques Lewlain  
Call placed to [facility]. Awaiting response  
Occupational Therapy/Physical Therapy    Orders received, chart reviewed.  Screened pt in room.  Pt moving independently within room although reports that R hip is painful.  No therapy needs at this time.  Will discontinue orders.  If new concerns arise, please reorder.    Lianne Ewing, OTR/L #[id number]  Beti Araiza, PT, DPT  
Patient Refused AM lab draw at this time.  Tona Vicente RN   
Patient discharging to [facility], all discharge papers given. All questions answered. No pain and or discomfort  noted and or reported. [facility] to pick patient up. Wheelchair assistance to car  
Patient in bed resting at this time no c/o pain and or discomfort.  Patient able to walk short distances without difficulty. All medication administered, no adverse reactions noted and or reported. Patient able to voice all demands, no s/s of hypo/hyperglycemia noted and or reported. All safety precautions in place  
Patient in chair resting at this time. No c/o pain and or discomfort noted and or reported. All medication administered, no adverse reactions noted and or reported. No s/s of hypo/hyperglycemia noted and or reported. Patient has good appetite. Walks with steady gait. Patient able to voice all demands. All safety precautions in place  
Patient in room resting at this time, no c/o pain and or discomfort noted and or reported. Patient now refusing fingersticks. Patient has no s/s of hypo/hyperglycemia noted and or reported. Patient has good appetite, and able to walk with steady gait in room. Patient able to voice all demands. All safety precautions in place  
Patient refused AM labs at this time.  Tona Vicente RN   
Patient refused secondary vitals at this time.  Tona Vicente RN  
Patient refusing insulin, clonidine and lasix. Educated on the importance of both medications, patient continues to refuse. All safety precautions in place  
Perfect serve sent to [facility]. Patient's blood sugar is 394. Patient refusing insulin. All safety precautions in place  
Pt refused labs for this morning.  
RT Inhaler-Nebulizer Bronchodilator Protocol Note    There is a bronchodilator order in the chart from a provider indicating to follow the RT Bronchodilator Protocol and there is an “Initiate RT Inhaler-Nebulizer Bronchodilator Protocol” order as well (see protocol at bottom of note).    CXR Findings:  No results found.    The findings from the last RT Protocol Assessment were as follows:   History Pulmonary Disease: Chronic pulmonary disease  Respiratory Pattern: Regular pattern and RR 12-20 bpm  Breath Sounds: Slightly diminished and/or crackles  Cough: Strong, spontaneous, non-productive  Indication for Bronchodilator Therapy: Decreased or absent breath sounds  Bronchodilator Assessment Score: 4    Aerosolized bronchodilator medication orders have been revised according to the RT Inhaler-Nebulizer Bronchodilator Protocol below.    Respiratory Therapist to perform RT Therapy Protocol Assessment initially then follow the protocol.  Repeat RT Therapy Protocol Assessment PRN for score 0-3 or on second treatment, BID, and PRN for scores above 3.    No Indications - adjust the frequency to every 6 hours PRN wheezing or bronchospasm, if no treatments needed after 48 hours then discontinue using Per Protocol order mode.     If indication present, adjust the RT bronchodilator orders based on the Bronchodilator Assessment Score as indicated below.  Use Inhaler orders unless patient has one or more of the following: on home nebulizer, not able to hold breath for 10 seconds, is not alert and oriented, cannot activate and use MDI correctly, or respiratory rate 25 breaths per minute or more, then use the equivalent nebulizer order(s) with same Frequency and PRN reasons based on the score.  If a patient is on this medication at home then do not decrease Frequency below that used at home.    0-3 - enter or revise RT bronchodilator order(s) to equivalent RT Bronchodilator order with Frequency of every 4 hours PRN for wheezing or 
Shift report given to [place] at bedside. Patient is stable. All end of shift needs have been met. No further assistance needed at this time.    
Vanco day 3  Trough 13.1  Regimen: 1750 mg IV every 12 hours.  Start time: 11:00 on 02/09/2025  Exposure target: AUC24 (range)400-600 mg/L.hr   YSV05-58: 548 mg/L.hr  AUC24,ss: 554 mg/L.hr  Probability of AUC24 > 400: 100 %  Ctrough,ss: 13.4 mg/L  Probability of Ctrough,ss > 20: 0 %    Continue  Vanco 1750mg q12h    Paulina Hall [facility]       
increased work of breathing using Per Protocol order mode.        4-6 - enter or revise RT Bronchodilator order(s) to two equivalent RT bronchodilator orders with one order with BID Frequency and one order with Frequency of every 4 hours PRN wheezing or increased work of breathing using Per Protocol order mode.        7-10 - enter or revise RT Bronchodilator order(s) to two equivalent RT bronchodilator orders with one order with TID Frequency and one order with Frequency of every 4 hours PRN wheezing or increased work of breathing using Per Protocol order mode.       11-13 - enter or revise RT Bronchodilator order(s) to one equivalent RT bronchodilator order with QID Frequency and an Albuterol order with Frequency of every 4 hours PRN wheezing or increased work of breathing using Per Protocol order mode.      Greater than 13 - enter or revise RT Bronchodilator order(s) to one equivalent RT bronchodilator order with every 4 hours Frequency and an Albuterol order with Frequency of every 2 hours PRN wheezing or increased work of breathing using Per Protocol order mode.         Electronically signed by Nicki Caldwell RCP on 2/6/2025 at 6:59 PM  
aneurysm repair (2022).  Chronic type B aortic dissection.  -strict BP control.  -outpatient follow up with cardiology for surveillance.      Methamphetamine use disorder.  -currently residing at [facility] for treatment.     Mood disorder.  -continue buspirone, bupropion, seroquel, Zoloft, and trazodone.      Morbid Obesity.  - Body mass index is 40.69 kg/m².  - Complicating assessment and treatment. Placing patient at risk for multiple co-morbidities as well as early death and contributing to the patient's presentation.   - Counseled on weight loss.      Note above makes patient higher risk for morbidity and mortality requiring testing and treatment.   DVT Prophylaxis: Eliquis  Diet: No diet orders on file  Code Status: Prior      MORA WALLIS MD          
morning feeling very weak. When he got up, he lost his balance and fell. He denies any SOB, but does report having a cough which is productive of yellow sputum. Endorses nausea, no vomiting, abdominal pain, or diarrhea. Additionally, pt mentions that he has had peripheral vision loss of his left eye since the episode this morning. Denies any chest pain, headache, or other neurological symptoms. \"      Patient Complaints:   None reported during pt interview    Baseline Method of Oral Meds: Whole with liquid     Admitting diagnoses and active problems as follows: Principal Problem:    HCAP (healthcare-associated pneumonia)  Active Problems:    Cerebrovascular accident (CVA) (HCC)    Congestive heart failure (CHF) (HCC)    Mixed hyperlipidemia    Primary hypertension    Weakness    Coronary artery disease due to lipid rich plaque  Resolved Problems:    * No resolved hospital problems. *      Additional Pertinent Information and Pt-Reported Symptoms/Complaints:   Order paced d/ chest imaging indicating right lung PNA        Predisposing dysphagia risk factors: GERD and Drug Abuse  Clinical signs of possible chronic dysphagia: N/A  Precipitating dysphagia risk factors: N/A    Code Status as listed in chart:  Full Code      Cranial nerve exam:   Unable to complete formal CNE as pt declined completion. No overt changes/abnormalities observed from baseline     Laryngeal function exam:   Secretions: Oral mucosa pink and moist   Vocal quality: See CN exam above  MPT: See CN exam above  S/Z ratio: DNT  Pitch range: See CN exam above  Cough: See CN exam above    Oral Care Status:    Oral care WFL    Oral Care Completed?   [] Yes   [x] No  Not warranted-oral care adequate upon assessment    PO trials:   Baseline cough noted prior to PO trials? [x] Yes   [] No  Baseline SPO2%: 90% via RA  Baseline RR: 20    IDDSI 0 (thin):   - Straw: no anterior bolus loss , suspect functional A-P bolus transit, swallow timing subjectively

## 2025-02-10 NOTE — CARE COORDINATION
Noted pt was discharged to [facility] ([facility]. Spoke with Flakita at [facility] and she will send someone to transport pt back. Chart reviewed and no dc needs identified.

## 2025-02-10 NOTE — FLOWSHEET NOTE
02/09/25 2054   Vital Signs   Temp 97.3 °F (36.3 °C)   Temp Source Oral   Pulse 57   Heart Rate Source Monitor   Respirations 18   BP (!) 166/94   MAP (Calculated) 118   BP Location Right upper arm   Opioid-Induced Sedation   POSS Score 1   Oxygen Therapy   SpO2 97 %   O2 Device None (Room air)     Zanaflex po given for chronic back pain/spasms 10/10. Refused tylenol. Refused IV ABT in the middle of the night (0100). Said he would take IV ABT at 2300 though. Pt given bath wipes and new gown, did not want to get into the shower because he didn't want his IV to get messed up. Nena Naidu, RN